# Patient Record
Sex: FEMALE | Race: WHITE | NOT HISPANIC OR LATINO | Employment: OTHER | URBAN - METROPOLITAN AREA
[De-identification: names, ages, dates, MRNs, and addresses within clinical notes are randomized per-mention and may not be internally consistent; named-entity substitution may affect disease eponyms.]

---

## 2017-01-05 ENCOUNTER — TELEPHONE (OUTPATIENT)
Dept: NEUROLOGY | Facility: MEDICAL CENTER | Age: 47
End: 2017-01-05

## 2017-01-05 NOTE — TELEPHONE ENCOUNTER
Pt is calling and asking if she can see a chiropractor with her Arnold-Chiari decompression. She also wants to know if the tonsils are starting to descend again and how far have they dropped. Please advise. Thank you. JC

## 2017-01-07 ENCOUNTER — OFFICE VISIT (OUTPATIENT)
Dept: URGENT CARE | Facility: PHYSICIAN GROUP | Age: 47
End: 2017-01-07
Payer: COMMERCIAL

## 2017-01-07 VITALS
RESPIRATION RATE: 20 BRPM | DIASTOLIC BLOOD PRESSURE: 70 MMHG | SYSTOLIC BLOOD PRESSURE: 118 MMHG | HEIGHT: 65 IN | BODY MASS INDEX: 43.65 KG/M2 | OXYGEN SATURATION: 96 % | HEART RATE: 95 BPM | TEMPERATURE: 97.3 F | WEIGHT: 262 LBS

## 2017-01-07 DIAGNOSIS — J01.40 ACUTE PANSINUSITIS, RECURRENCE NOT SPECIFIED: ICD-10-CM

## 2017-01-07 DIAGNOSIS — H10.33 ACUTE BACTERIAL CONJUNCTIVITIS OF BOTH EYES: ICD-10-CM

## 2017-01-07 DIAGNOSIS — R42 DIZZINESS: ICD-10-CM

## 2017-01-07 PROCEDURE — 99214 OFFICE O/P EST MOD 30 MIN: CPT | Performed by: NURSE PRACTITIONER

## 2017-01-07 RX ORDER — MECLIZINE HYDROCHLORIDE 25 MG/1
25 TABLET ORAL 3 TIMES DAILY PRN
Qty: 15 TAB | Refills: 0 | Status: SHIPPED | OUTPATIENT
Start: 2017-01-07 | End: 2017-01-12

## 2017-01-07 RX ORDER — AMOXICILLIN AND CLAVULANATE POTASSIUM 875; 125 MG/1; MG/1
1 TABLET, FILM COATED ORAL 2 TIMES DAILY
Qty: 10 TAB | Refills: 0 | Status: SHIPPED | OUTPATIENT
Start: 2017-01-07 | End: 2017-01-12

## 2017-01-07 RX ORDER — POLYMYXIN B SULFATE AND TRIMETHOPRIM 1; 10000 MG/ML; [USP'U]/ML
1 SOLUTION OPHTHALMIC EVERY 4 HOURS
Qty: 10 ML | Refills: 0 | Status: SHIPPED | OUTPATIENT
Start: 2017-01-07 | End: 2017-01-14

## 2017-01-07 ASSESSMENT — ENCOUNTER SYMPTOMS
COUGH: 0
SORE THROAT: 0
WHEEZING: 0
CHILLS: 0
SPUTUM PRODUCTION: 0
WEAKNESS: 0
HEADACHES: 0
FEVER: 0
PALPITATIONS: 0
SHORTNESS OF BREATH: 0
DIZZINESS: 1
PHOTOPHOBIA: 0
EYE PAIN: 0
DIAPHORESIS: 0
DOUBLE VISION: 0
EYE REDNESS: 1
HEMOPTYSIS: 0
EYE DISCHARGE: 1
BLURRED VISION: 0
ORTHOPNEA: 0
MYALGIAS: 0

## 2017-01-07 NOTE — MR AVS SNAPSHOT
"        Demetra Castanonfidelia   2017 2:30 PM   Office Visit   MRN: [id number]    Department:  [facility]   Dept Phone:  [phone]    Description:  Female : 1970   Provider:  RUSTY Rendon           Reason for Visit     Sinus Problem x \"months\" increased 17      Allergies as of 2017     No Known Allergies      You were diagnosed with     Acute pansinusitis, recurrence not specified   [0125991]       Acute bacterial conjunctivitis of both eyes   [9841188]       Dizziness   [325813]         Vital Signs     Blood Pressure Pulse Temperature Respirations Height Weight    118/70 mmHg 95 36.3 °C (97.3 °F) 20 1.651 m (5' 5\") 118.842 kg (262 lb)    Body Mass Index Oxygen Saturation Smoking Status             43.60 kg/m2 96% Former Smoker         Basic Information     Date Of Birth Sex Race Ethnicity Preferred Language    1970 Female White Non- English      Your appointments     Mar 03, 2017  1:00 PM   Follow Up Visit with Nathan Mike M.D.   [facility] (--)    [address], [address]  [facility] [address] [phone]           You will be receiving a confirmation call a few days before your appointment from our automated call confirmation system.              Problem List              ICD-10-CM Priority Class Noted - Resolved    Hypothyroidism E03.9   2013 - Present    Meniere's disease    2013 - Present    Vertigo R42   2013 - Present    GERD (gastroesophageal reflux disease) K21.9   2013 - Present    Sinus pressure J34.89   3/3/2014 - Present    Arnold-Chiari malformation, type I (HCC) G93.5   2016 - Present    Intractable migraine without aura and without status migrainosus G43.019   2016 - Present      Health Maintenance        Date Due Completion Dates    IMM DTaP/Tdap/Td Vaccine (1 - Tdap) 3/29/1991 ---    MAMMOGRAM 3/29/2012 ---    IMM INFLUENZA (1) 2016 10/22/2013    PAP SMEAR 2017 " (N/S)    Override on 1/28/2014: (N/S) (dr benoit)            Current Immunizations     Influenza TIV (IM) 10/22/2013      Below and/or attached are the medications your provider expects you to take. Review all of your home medications and newly ordered medications with your provider and/or pharmacist. Follow medication instructions as directed by your provider and/or pharmacist. Please keep your medication list with you and share with your provider. Update the information when medications are discontinued, doses are changed, or new medications (including over-the-counter products) are added; and carry medication information at all times in the event of emergency situations     Allergies:  No Known Allergies          Medications  Valid as of: January 07, 2017 -  3:03 PM    Generic Name Brand Name Tablet Size Instructions for use    Albuterol Sulfate (Aero Soln) albuterol 108 (90 BASE) MCG/ACT Inhale 2 Puffs by mouth every 6 hours as needed for Shortness of Breath.        Amoxicillin-Pot Clavulanate (Tab) AUGMENTIN 875-125 MG Take 1 Tab by mouth 2 times a day for 5 days.        Biotin (Tab) Biotin 5000 MCG Take  by mouth.        Cyclobenzaprine HCl (Tab) FLEXERIL 10 MG Take 1 Tab by mouth 3 times a day as needed.        DiazePAM (Tab) VALIUM 5 MG Take 1 Tab by mouth every 6 hours as needed (dizziness).        Levothyroxine Sodium (Tab) SYNTHROID 25 MCG Take 1 Tab by mouth every day.        Magnesium (Tab) Magnesium 250 MG Take  by mouth.        Meclizine HCl (Tab) ANTIVERT 25 MG Take 1 Tab by mouth 3 times a day as needed for Dizziness for up to 5 days.        Milk Thistle   Take  by mouth.        Omega-3 Fatty Acids   Take  by mouth.        Omeprazole (CAPSULE DELAYED RELEASE) PRILOSEC 10 MG Take 10 mg by mouth every day.        Polymyxin B-Trimethoprim (Solution) POLYTRIM 97555-5.1 UNIT/ML-% Place 1 Drop in both eyes every 4 hours for 7 days.        Probiotic Product   Take  by mouth.        Pseudoephedrine HCl    Take  by mouth.        .                 Medicines prescribed today were sent to:     [id number] - [place], [place] - [address]    [address] [place] [address]    Phone: [phone] Fax: [phone]    Open 24 Hours?: No      Medication refill instructions:       If your prescription bottle indicates you have medication refills left, it is not necessary to call your provider’s office. Please contact your pharmacy and they will refill your medication.    If your prescription bottle indicates you do not have any refills left, you may request refills at any time through one of the following ways: The online OceanTailer system (except Urgent Care), by calling your provider’s office, or by asking your pharmacy to contact your provider’s office with a refill request. Medication refills are processed only during regular business hours and may not be available until the next business day. Your provider may request additional information or to have a follow-up visit with you prior to refilling your medication.   *Please Note: Medication refills are assigned a new Rx number when refilled electronically. Your pharmacy may indicate that no refills were authorized even though a new prescription for the same medication is available at the pharmacy. Please request the medicine by name with the pharmacy before contacting your provider for a refill.           OceanTailer Access Code: [email or web address]  Expires: 1/25/2017  8:44 AM    OceanTailer  A secure, online tool to manage your health information     [employer]’s OceanTailer® is a secure, online tool that connects you to your personalized health information from the privacy of your home -- day or night - making it very easy for you to manage your healthcare. Once the activation process is completed, you can even access your medical information using the OceanTailer luz, which is available for free in the Apple Luz store or Google Play store.     OceanTailer provides the  following levels of access (as shown below):   My Chart Features   [facility] Doctor [facility]  Specialists [facility]  Urgent  Care Non-[facility]  Primary Care  Doctor   Email your healthcare team securely and privately 24/7 X X X    Manage appointments: schedule your next appointment; view details of past/upcoming appointments X      Request prescription refills. X      View recent personal medical records, including lab and immunizations X X X X   View health record, including health history, allergies, medications X X X X   Read reports about your outpatient visits, procedures, consult and ER notes X X X X   See your discharge summary, which is a recap of your hospital and/or ER visit that includes your diagnosis, lab results, and care plan. X X       How to register for M[employer]:  1. Go to  [email or web address].  2. Click on the Sign Up Now box, which takes you to the New Member Sign Up page. You will need to provide the following information:  a. Enter your Mebelrama Access Code exactly as it appears at the top of this page. (You will not need to use this code after you’ve completed the sign-up process. If you do not sign up before the expiration date, you must request a new code.)   b. Enter your date of birth.   c. Enter your home email address.   d. Click Submit, and follow the next screen’s instructions.  3. Create a Mebelrama ID. This will be your Mebelrama login ID and cannot be changed, so think of one that is secure and easy to remember.  4. Create a Mebelrama password. You can change your password at any time.  5. Enter your Password Reset Question and Answer. This can be used at a later time if you forget your password.   6. Enter your e-mail address. This allows you to receive e-mail notifications when new information is available in Mebelrama.  7. Click Sign Up. You can now view your health information.    For assistance activating your Mebelrama account, call [phone]

## 2017-01-07 NOTE — PROGRESS NOTES
"Subjective:      Demetra Kendrick is a 46 y.o. female who presents with Sinus Problem            Sinus Problem  Associated symptoms include congestion. Pertinent negatives include no chills, coughing, diaphoresis, ear pain, headaches, shortness of breath or sore throat.   Patient comes in today with a months long history of sinus congestion and dizziness.  She had a consult with ENT in October or November of 2016 and was advised to discontinue use of Afrin nasal spray, which she had been using daily for years.  She reports that since then, the congestion and dizziness have improved but persisted.  She reports a recent URI 10 days ago with progressively worsening sinus pain and drainage over the past 7 days.  She also has new onset of bilateral eye redness with a matting purulence since yesterday.  She is currently taking Claritin D and using a neti pot per her ENT's instructions from her consult several months ago.  Denies any fever, chills, otalgia or cough.    Review of Systems   Constitutional: Positive for malaise/fatigue. Negative for fever, chills and diaphoresis.   HENT: Positive for congestion. Negative for ear pain and sore throat.    Eyes: Positive for discharge and redness. Negative for blurred vision, double vision, photophobia and pain.   Respiratory: Negative for cough, hemoptysis, sputum production, shortness of breath and wheezing.    Cardiovascular: Negative for chest pain, palpitations, orthopnea and leg swelling.   Musculoskeletal: Negative for myalgias.   Skin: Negative for rash.   Neurological: Positive for dizziness. Negative for weakness and headaches.     Medications, Allergies, and current problem list reviewed today in Epic     Objective:     /70 mmHg  Pulse 95  Temp(Src) 36.3 °C (97.3 °F)  Resp 20  Ht 1.651 m (5' 5\")  Wt 118.842 kg (262 lb)  BMI 43.60 kg/m2  SpO2 96%     Physical Exam   Constitutional: She is oriented to person, place, and time. She appears " well-developed and well-nourished. No distress.   HENT:   Head: Normocephalic.   Mouth/Throat: Oropharynx is clear and moist. No oropharyngeal exudate.   Nares patent.  Diffuse sinus pain on palpation.  Bilateral clear middle ear effusion with no erythema or purulence.     Eyes: EOM are normal. Pupils are equal, round, and reactive to light. Right eye exhibits discharge. Left eye exhibits discharge. No scleral icterus.   Bilateral conjunctival injection with scant matting purulence on the lower lash line and inner canthus.  No periorbital pain, swelling, or erythema.  No nystagmus or visual field cuts.   Neck: Neck supple. No JVD present. No tracheal deviation present. No thyromegaly present.   Cardiovascular: Normal rate, regular rhythm and normal heart sounds.  Exam reveals no gallop and no friction rub.    No murmur heard.  Pulmonary/Chest: Effort normal and breath sounds normal. No stridor. No respiratory distress. She has no wheezes. She has no rales. She exhibits no tenderness.   Musculoskeletal: She exhibits no edema.   Lymphadenopathy:     She has no cervical adenopathy.   Neurological: She is alert and oriented to person, place, and time.   Skin: Skin is warm and dry. No rash noted. She is not diaphoretic. No erythema.   Vitals reviewed.              Assessment/Plan:     1. Acute pansinusitis, recurrence not specified  - amoxicillin-clavulanate (AUGMENTIN) 875-125 MG Tab; Take 1 Tab by mouth 2 times a day for 5 days.  Dispense: 10 Tab; Refill: 0    2. Acute bacterial conjunctivitis of both eyes  - polymixin-trimethoprim (POLYTRIM) 92585-5.1 UNIT/ML-% Solution; Place 1 Drop in both eyes every 4 hours for 7 days.  Dispense: 10 mL; Refill: 0    3. Dizziness  - meclizine (ANTIVERT) 25 MG Tab; Take 1 Tab by mouth 3 times a day as needed for Dizziness for up to 5 days.  Dispense: 15 Tab; Refill: 0    Take full course of antibiotics: systemic for sinusitis and ophthalmic for presumed bacterial conjunctivitis.  OTC  Claritin D prn symptom management.  Antivert as prescribed prn dizziness.  Maintain adequate po hydration.  RTC in 5-7 days if symptoms persist, sooner if worse.  Follow up with ENT for any persistent or worsening dizziness.  Do not drive while dizzy.  Patient verbalized understanding of and agreed with plan of care.

## 2017-01-28 ENCOUNTER — HOSPITAL ENCOUNTER (EMERGENCY)
Facility: MEDICAL CENTER | Age: 47
End: 2017-01-28
Attending: EMERGENCY MEDICINE
Payer: COMMERCIAL

## 2017-01-28 VITALS
OXYGEN SATURATION: 98 % | DIASTOLIC BLOOD PRESSURE: 69 MMHG | RESPIRATION RATE: 18 BRPM | TEMPERATURE: 97.2 F | WEIGHT: 255.73 LBS | SYSTOLIC BLOOD PRESSURE: 145 MMHG | HEIGHT: 65 IN | HEART RATE: 81 BPM | BODY MASS INDEX: 42.61 KG/M2

## 2017-01-28 DIAGNOSIS — R42 VERTIGO: ICD-10-CM

## 2017-01-28 DIAGNOSIS — H65.93 BILATERAL SEROUS OTITIS MEDIA, UNSPECIFIED CHRONICITY: ICD-10-CM

## 2017-01-28 PROCEDURE — 99283 EMERGENCY DEPT VISIT LOW MDM: CPT

## 2017-01-28 RX ORDER — AMOXICILLIN 875 MG/1
875 TABLET, COATED ORAL 2 TIMES DAILY
Qty: 40 TAB | Refills: 0 | Status: SHIPPED | OUTPATIENT
Start: 2017-01-28 | End: 2017-02-17

## 2017-01-28 ASSESSMENT — ENCOUNTER SYMPTOMS
CHILLS: 0
EYE PAIN: 1
FEVER: 0
NAUSEA: 1
DIZZINESS: 1

## 2017-01-28 ASSESSMENT — PAIN SCALES - GENERAL: PAINLEVEL_OUTOF10: 0

## 2017-01-28 NOTE — ED AVS SNAPSHOT
1/28/2017          Demetra Kendrick  [address] [place] [address]    Dear Demetra:    [facility] wants to ensure your discharge home is safe and you or your loved ones have had all your questions answered regarding your care after you leave the hospital.    You may receive a telephone call within two days of your discharge.  This call is to make certain you understand your discharge instructions as well as ensure we provided you with the best care possible during your stay with us.     The call will only last approximately 3-5 minutes and will be done by a nurse.    Once again, we want to ensure your discharge home is safe and that you have a clear understanding of any next steps in your care.  If you have any questions or concerns, please do not hesitate to contact us, we are here for you.  Thank you for choosing [facility] for your healthcare needs.    Sincerely,    John Arce    [facility]

## 2017-01-28 NOTE — ED PROVIDER NOTES
ED Provider Note    Scribed for Ernesto Saul M.D. by Micheline Irizarry. 1/28/2017, 9:36 AM.    Primary care provider: Jaimie Whittington M.D.  Means of arrival: walk in   History obtained from: patient   History limited by: none       CHIEF COMPLAINT  Chief Complaint   Patient presents with   • Nausea   • Dizziness   • Headache       HPI  Demetra Kendrick is a 46 y.o. female with a history of chiari malformation who presents to the Emergency Department for dizziness onset three weeks ago with progressively worsening severity. The patient describes her symptoms as similar to vertigo with associated motion sickness like she is on a boat. With walking she feels like she is walking side ways or walking on ice. Patient had nausea yesterday. She also complains of intermittent itchiness to her ears, bilateral mild eye pain and inability to smell. Patient was recently evaluated by her ENT specialist after using Afrin every day for several months. She has since stopped using Afrin. Patient was also recently seen by Dr. Mike, neurology, who reported chronic changes on her MRI. She denies fevers and chills.       REVIEW OF SYSTEMS  Review of Systems   Constitutional: Negative for fever and chills.   HENT:        Pruritis to bilateral ears. Unable to smell    Eyes: Positive for pain.   Gastrointestinal: Positive for nausea.   Neurological: Positive for dizziness.          PAST MEDICAL HISTORY   has a past medical history of Thyroid disease and GERD (gastroesophageal reflux disease).   Chiari malformation       SURGICAL HISTORY   has past surgical history that includes other neurological surg (2010).      SOCIAL HISTORY  Social History   Substance Use Topics   • Smoking status: Former Smoker -- 0.50 packs/day for 18 years     Types: Cigarettes     Quit date: 08/16/2012   • Smokeless tobacco: Never Used      Comment: quit 8/18/12   • Alcohol Use: 0.0 oz/week     0 Standard drinks or equivalent per week      Comment: social  "     History   Drug Use No       FAMILY HISTORY  Family History   Problem Relation Age of Onset   • Thyroid Mother    • Lung Disease Father    • GI Father      ulcerative colitis   • Arthritis Father      gout   • Thyroid Sister    • Other Sister      MS   • Thyroid Sister        CURRENT MEDICATIONS  Home Medications     **Home medications have not yet been reviewed for this encounter**          ALLERGIES  No Known Allergies        PHYSICAL EXAM  VITAL SIGNS: /74 mmHg  Pulse 89  Temp(Src) 36.2 °C (97.2 °F) (Temporal)  Resp 16  Ht 1.651 m (5' 5\")  Wt 116 kg (255 lb 11.7 oz)  BMI 42.56 kg/m2  SpO2 98%  Constitutional:   No acute distress  HENT:  Moist mucous membranes. TMs with yellow discoloration to the lower half of the bilateral TMs. Bilateral nasal congestion.   Eyes:  No conjunctivitis or icterus  Neck:  trachea is midline, no palpable thyroid  Lymphatic:  No cervical lymphadenopathy  Cardiovascular: Regular rate and rhythm, no murmurs  Thorax & Lungs:  Normal breath sounds, no rhonchi  Abdomen: Obese.  Soft, Non-tender  Skin:.  no rash  Back:  Non-tender, no CVA tenderness  Extremities:   no edema  Vascular:  symmetric radial pulse  Neurologic: Alert and oriented. Cranial nerves II-XII intact, EOMs intact, no tongue deviation, PERRL, no facial asymmetry to motor or sensation, symmetric palate, normal finger-to-nose test, no pronator drift. No focal motor deficits. Symmetric reflexes. Normal station and gait, normal tandem walk.        COURSE & MEDICAL DECISION MAKING  Pertinent Labs & Imaging studies reviewed. (See chart for details)    9:36 AM - Patient seen and examined at bedside.     9:49 AM Obtained and reviewed past medical records which indicated the patient had an MRI in October which indicated congestion of the right sinus.     9:55 AM Patient agrees to discharge home with a course of antibiotics and follow up to Dr. Fish, ENT.   She was encouraged to continue taking Flonase.  The patient " will return for new or worsening symptoms and is stable at the time of discharge.            DISPOSITION:  Patient will be discharged home in stable condition.      FOLLOW UP:  Jaimie Whittington M.D.  [address]  [place] [address] [phone]              OUTPATIENT MEDICATIONS:  New Prescriptions    AMOXICILLIN (AMOXIL) 875 MG TABLET    Take 1 Tab by mouth 2 times a day for 20 days.       FINAL IMPRESSION  1. Bilateral serous otitis media, unspecified chronicity    2. Vertigo             Micheline LEAL (Suri), am scribing for, and in the presence of, Ernesto Saul M.D..  Electronically signed by: Micheline Briscoe), 1/28/2017  Ernesto LEAL M.D. personally performed the services described in this documentation, as scribed by Micheline Irizarry in my presence, and it is both accurate and complete.      The note accurately reflects work and decisions made by me.  Ernesto Saul  1/28/2017  2:09 PM

## 2017-01-28 NOTE — ED NOTES
"Pt with multiple complaints, c/o \" not being able to smell, things not tasting right, worsening of chronic dizziness\"   "

## 2017-01-28 NOTE — ED NOTES
Pt amb to triage.  Chief Complaint   Patient presents with   • Nausea   • Dizziness   • Headache     Pt reports symptoms xmonths. No neuro deficts noted.  Seen here for the same in October 2016.  Has appt w/ [facility] in March.  Pt asked to wait in lobby. Advised of wait time and triage process. Advised to alert RN w/ any changes in condition. Thanked for patience.

## 2017-01-28 NOTE — DISCHARGE INSTRUCTIONS
Sinusitis, Adult  Sinusitis is redness, soreness, and inflammation of the paranasal sinuses. Paranasal sinuses are air pockets within the bones of your face. They are located beneath your eyes, in the middle of your forehead, and above your eyes. In healthy paranasal sinuses, mucus is able to drain out, and air is able to circulate through them by way of your nose. However, when your paranasal sinuses are inflamed, mucus and air can become trapped. This can allow bacteria and other germs to grow and cause infection.  Sinusitis can develop quickly and last only a short time (acute) or continue over a long period (chronic). Sinusitis that lasts for more than 12 weeks is considered chronic.  CAUSES  Causes of sinusitis include:  · Allergies.  · Structural abnormalities, such as displacement of the cartilage that separates your nostrils (deviated septum), which can decrease the air flow through your nose and sinuses and affect sinus drainage.  · Functional abnormalities, such as when the small hairs (cilia) that line your sinuses and help remove mucus do not work properly or are not present.  SIGNS AND SYMPTOMS  Symptoms of acute and chronic sinusitis are the same. The primary symptoms are pain and pressure around the affected sinuses. Other symptoms include:  · Upper toothache.  · Earache.  · Headache.  · Bad breath.  · Decreased sense of smell and taste.  · A cough, which worsens when you are lying flat.  · Fatigue.  · Fever.  · Thick drainage from your nose, which often is green and may contain pus (purulent).  · Swelling and warmth over the affected sinuses.  DIAGNOSIS  Your health care provider will perform a physical exam. During your exam, your health care provider may perform any of the following to help determine if you have acute sinusitis or chronic sinusitis:  · Look in your nose for signs of abnormal growths in your nostrils (nasal polyps).  · Tap over the affected sinus to check for signs of  infection.  · View the inside of your sinuses using an imaging device that has a light attached (endoscope).  If your health care provider suspects that you have chronic sinusitis, one or more of the following tests may be recommended:  · Allergy tests.  · Nasal culture. A sample of mucus is taken from your nose, sent to a lab, and screened for bacteria.  · Nasal cytology. A sample of mucus is taken from your nose and examined by your health care provider to determine if your sinusitis is related to an allergy.  TREATMENT  Most cases of acute sinusitis are related to a viral infection and will resolve on their own within 10 days. Sometimes, medicines are prescribed to help relieve symptoms of both acute and chronic sinusitis. These may include pain medicines, decongestants, nasal steroid sprays, or saline sprays.  However, for sinusitis related to a bacterial infection, your health care provider will prescribe antibiotic medicines. These are medicines that will help kill the bacteria causing the infection.  Rarely, sinusitis is caused by a fungal infection. In these cases, your health care provider will prescribe antifungal medicine.  For some cases of chronic sinusitis, surgery is needed. Generally, these are cases in which sinusitis recurs more than 3 times per year, despite other treatments.  HOME CARE INSTRUCTIONS  · Drink plenty of water. Water helps thin the mucus so your sinuses can drain more easily.  · Use a humidifier.  · Inhale steam 3-4 times a day (for example, sit in the bathroom with the shower running).  · Apply a warm, moist washcloth to your face 3-4 times a day, or as directed by your health care provider.  · Use saline nasal sprays to help moisten and clean your sinuses.  · Take medicines only as directed by your health care provider.  · If you were prescribed either an antibiotic or antifungal medicine, finish it all even if you start to feel better.  SEEK IMMEDIATE MEDICAL CARE IF:  · You have  increasing pain or severe headaches.  · You have nausea, vomiting, or drowsiness.  · You have swelling around your face.  · You have vision problems.  · You have a stiff neck.  · You have difficulty breathing.     This information is not intended to replace advice given to you by your health care provider. Make sure you discuss any questions you have with your health care provider.     Document Released: 12/18/2006 Document Revised: 01/08/2016 Document Reviewed: 01/01/2013  [employer] Interactive Patient Education ©2016 [employer]    Vertigo  Vertigo means you feel like you or your surroundings are moving when they are not. Vertigo can be dangerous if it occurs when you are at work, driving, or performing difficult activities.   CAUSES   Vertigo occurs when there is a conflict of signals sent to your brain from the visual and sensory systems in your body. There are many different causes of vertigo, including:  · Infections, especially in the inner ear.  · A bad reaction to a drug or misuse of alcohol and medicines.  · Withdrawal from drugs or alcohol.  · Rapidly changing positions, such as lying down or rolling over in bed.  · A migraine headache.  · Decreased blood flow to the brain.  · Increased pressure in the brain from a head injury, infection, tumor, or bleeding.  SYMPTOMS   You may feel as though the world is spinning around or you are falling to the ground. Because your balance is upset, vertigo can cause nausea and vomiting. You may have involuntary eye movements (nystagmus).  DIAGNOSIS   Vertigo is usually diagnosed by physical exam. If the cause of your vertigo is unknown, your caregiver may perform imaging tests, such as an MRI scan (magnetic resonance imaging).  TREATMENT   Most cases of vertigo resolve on their own, without treatment. Depending on the cause, your caregiver may prescribe certain medicines. If your vertigo is related to body position issues, your caregiver may recommend movements or  procedures to correct the problem. In rare cases, if your vertigo is caused by certain inner ear problems, you may need surgery.  HOME CARE INSTRUCTIONS   · Follow your caregiver's instructions.  · Avoid driving.  · Avoid operating heavy machinery.  · Avoid performing any tasks that would be dangerous to you or others during a vertigo episode.  · Tell your caregiver if you notice that certain medicines seem to be causing your vertigo. Some of the medicines used to treat vertigo episodes can actually make them worse in some people.  SEEK IMMEDIATE MEDICAL CARE IF:   · Your medicines do not relieve your vertigo or are making it worse.  · You develop problems with talking, walking, weakness, or using your arms, hands, or legs.  · You develop severe headaches.  · Your nausea or vomiting continues or gets worse.  · You develop visual changes.  · A family member notices behavioral changes.  · Your condition gets worse.  MAKE SURE YOU:  · Understand these instructions.  · Will watch your condition.  · Will get help right away if you are not doing well or get worse.     This information is not intended to replace advice given to you by your health care provider. Make sure you discuss any questions you have with your health care provider.     Document Released: 09/27/2006 Document Revised: 03/11/2013 Document Reviewed: 04/11/2016  [employer] Interactive Patient Education ©2016 [employer]

## 2017-01-28 NOTE — ED NOTES
Pt discharged with instructions and script. Pt verbalizes the understanding of instructions. Pt ambulated out of ER without any difficulty.

## 2017-01-28 NOTE — ED AVS SNAPSHOT
After Visit Summary                                                                                                                Demetra Kendrick   MRN: [id number]    Department:  [facility], Emergency Dept   Date of Visit:  1/28/2017            [facility], Emergency Dept    [address]    [place] [address]    Phone:  [phone]      You were seen by     Ernesto Saul M.D.      Your Diagnosis Was     Bilateral serous otitis media, unspecified chronicity     H65.93       Follow-up Information     1. Follow up with Jaimie Whittington M.D..    Specialty:  Family Medicine    Contact information    [address]  [place] [address] [phone]        Medication Information     Review all of your home medications and newly ordered medications with your primary doctor and/or pharmacist as soon as possible. Follow medication instructions as directed by your doctor and/or pharmacist.     Please keep your complete medication list with you and share with your physician. Update the information when medications are discontinued, doses are changed, or new medications (including over-the-counter products) are added; and carry medication information at all times in the event of emergency situations.               Medication List      START taking these medications        Instructions    amoxicillin 875 MG tablet   Commonly known as:  AMOXIL    Take 1 Tab by mouth 2 times a day for 20 days.   Dose:  875 mg         ASK your doctor about these medications        Instructions    albuterol 108 (90 BASE) MCG/ACT Aers inhalation aerosol    Inhale 2 Puffs by mouth every 6 hours as needed for Shortness of Breath.   Dose:  2 Puff       Biotin 5000 MCG Tabs    Take  by mouth.       cyclobenzaprine 10 MG Tabs   Commonly known as:  FLEXERIL    Take 1 Tab by mouth 3 times a day as needed.   Dose:  10 mg       diazepam 5 MG Tabs   Commonly known as:  VALIUM    Take 1 Tab by mouth  every 6 hours as needed (dizziness).   Dose:  5 mg       levothyroxine 25 MCG Tabs   Commonly known as:  SYNTHROID    Take 1 Tab by mouth every day.   Dose:  25 mcg       Magnesium 250 MG Tabs    Take  by mouth.       MILK THISTLE PO    Take  by mouth.       OMEGA 3 PO    Take  by mouth.       PRILOSEC 10 MG Cpdr   Generic drug:  omeprazole    Take 10 mg by mouth every day.   Dose:  10 mg       PROBIOTIC DAILY PO    Take  by mouth.       SUDAFED 12 HOUR PO    Take  by mouth.                 Discharge Instructions       Sinusitis, Adult  Sinusitis is redness, soreness, and inflammation of the paranasal sinuses. Paranasal sinuses are air pockets within the bones of your face. They are located beneath your eyes, in the middle of your forehead, and above your eyes. In healthy paranasal sinuses, mucus is able to drain out, and air is able to circulate through them by way of your nose. However, when your paranasal sinuses are inflamed, mucus and air can become trapped. This can allow bacteria and other germs to grow and cause infection.  Sinusitis can develop quickly and last only a short time (acute) or continue over a long period (chronic). Sinusitis that lasts for more than 12 weeks is considered chronic.  CAUSES  Causes of sinusitis include:  · Allergies.  · Structural abnormalities, such as displacement of the cartilage that separates your nostrils (deviated septum), which can decrease the air flow through your nose and sinuses and affect sinus drainage.  · Functional abnormalities, such as when the small hairs (cilia) that line your sinuses and help remove mucus do not work properly or are not present.  SIGNS AND SYMPTOMS  Symptoms of acute and chronic sinusitis are the same. The primary symptoms are pain and pressure around the affected sinuses. Other symptoms include:  · Upper toothache.  · Earache.  · Headache.  · Bad breath.  · Decreased sense of smell and taste.  · A cough, which worsens when you are lying  flat.  · Fatigue.  · Fever.  · Thick drainage from your nose, which often is green and may contain pus (purulent).  · Swelling and warmth over the affected sinuses.  DIAGNOSIS  Your health care provider will perform a physical exam. During your exam, your health care provider may perform any of the following to help determine if you have acute sinusitis or chronic sinusitis:  · Look in your nose for signs of abnormal growths in your nostrils (nasal polyps).  · Tap over the affected sinus to check for signs of infection.  · View the inside of your sinuses using an imaging device that has a light attached (endoscope).  If your health care provider suspects that you have chronic sinusitis, one or more of the following tests may be recommended:  · Allergy tests.  · Nasal culture. A sample of mucus is taken from your nose, sent to a lab, and screened for bacteria.  · Nasal cytology. A sample of mucus is taken from your nose and examined by your health care provider to determine if your sinusitis is related to an allergy.  TREATMENT  Most cases of acute sinusitis are related to a viral infection and will resolve on their own within 10 days. Sometimes, medicines are prescribed to help relieve symptoms of both acute and chronic sinusitis. These may include pain medicines, decongestants, nasal steroid sprays, or saline sprays.  However, for sinusitis related to a bacterial infection, your health care provider will prescribe antibiotic medicines. These are medicines that will help kill the bacteria causing the infection.  Rarely, sinusitis is caused by a fungal infection. In these cases, your health care provider will prescribe antifungal medicine.  For some cases of chronic sinusitis, surgery is needed. Generally, these are cases in which sinusitis recurs more than 3 times per year, despite other treatments.  HOME CARE INSTRUCTIONS  · Drink plenty of water. Water helps thin the mucus so your sinuses can drain more  easily.  · Use a humidifier.  · Inhale steam 3-4 times a day (for example, sit in the bathroom with the shower running).  · Apply a warm, moist washcloth to your face 3-4 times a day, or as directed by your health care provider.  · Use saline nasal sprays to help moisten and clean your sinuses.  · Take medicines only as directed by your health care provider.  · If you were prescribed either an antibiotic or antifungal medicine, finish it all even if you start to feel better.  SEEK IMMEDIATE MEDICAL CARE IF:  · You have increasing pain or severe headaches.  · You have nausea, vomiting, or drowsiness.  · You have swelling around your face.  · You have vision problems.  · You have a stiff neck.  · You have difficulty breathing.     This information is not intended to replace advice given to you by your health care provider. Make sure you discuss any questions you have with your health care provider.     Document Released: 12/18/2006 Document Revised: 01/08/2016 Document Reviewed: 01/01/2013  [employer] Interactive Patient Education ©2016 [employer]    Vertigo  Vertigo means you feel like you or your surroundings are moving when they are not. Vertigo can be dangerous if it occurs when you are at work, driving, or performing difficult activities.   CAUSES   Vertigo occurs when there is a conflict of signals sent to your brain from the visual and sensory systems in your body. There are many different causes of vertigo, including:  · Infections, especially in the inner ear.  · A bad reaction to a drug or misuse of alcohol and medicines.  · Withdrawal from drugs or alcohol.  · Rapidly changing positions, such as lying down or rolling over in bed.  · A migraine headache.  · Decreased blood flow to the brain.  · Increased pressure in the brain from a head injury, infection, tumor, or bleeding.  SYMPTOMS   You may feel as though the world is spinning around or you are falling to the ground. Because your balance is upset,  vertigo can cause nausea and vomiting. You may have involuntary eye movements (nystagmus).  DIAGNOSIS   Vertigo is usually diagnosed by physical exam. If the cause of your vertigo is unknown, your caregiver may perform imaging tests, such as an MRI scan (magnetic resonance imaging).  TREATMENT   Most cases of vertigo resolve on their own, without treatment. Depending on the cause, your caregiver may prescribe certain medicines. If your vertigo is related to body position issues, your caregiver may recommend movements or procedures to correct the problem. In rare cases, if your vertigo is caused by certain inner ear problems, you may need surgery.  HOME CARE INSTRUCTIONS   · Follow your caregiver's instructions.  · Avoid driving.  · Avoid operating heavy machinery.  · Avoid performing any tasks that would be dangerous to you or others during a vertigo episode.  · Tell your caregiver if you notice that certain medicines seem to be causing your vertigo. Some of the medicines used to treat vertigo episodes can actually make them worse in some people.  SEEK IMMEDIATE MEDICAL CARE IF:   · Your medicines do not relieve your vertigo or are making it worse.  · You develop problems with talking, walking, weakness, or using your arms, hands, or legs.  · You develop severe headaches.  · Your nausea or vomiting continues or gets worse.  · You develop visual changes.  · A family member notices behavioral changes.  · Your condition gets worse.  MAKE SURE YOU:  · Understand these instructions.  · Will watch your condition.  · Will get help right away if you are not doing well or get worse.     This information is not intended to replace advice given to you by your health care provider. Make sure you discuss any questions you have with your health care provider.     Document Released: 09/27/2006 Document Revised: 03/11/2013 Document Reviewed: 04/11/2016  [employer] Interactive Patient Education ©2016 [employer]              Patient  Information     Patient Information    Following emergency treatment: all patient requiring follow-up care must return either to a private physician or a clinic if your condition worsens before you are able to obtain further medical attention, please return to the emergency room.     Billing Information    At [facility], we work to make the billing process streamlined for our patients.  Our Representatives are here to answer any questions you may have regarding your hospital bill.  If you have insurance coverage and have supplied your insurance information to us, we will submit a claim to your insurer on your behalf.  Should you have any questions regarding your bill, we can be reached online or by phone as follows:  Online: You are able pay your bills online or live chat with our representatives about any billing questions you may have. We are here to help Monday - Friday from 8:00am to 7:30pm and 9:00am - 12:00pm on Saturdays.  Please visit [email or web address]  for more information.   Phone:  [phone] or [phone]    Please note that your emergency physician, surgeon, pathologist, radiologist, anesthesiologist, and other specialists are not employed by [facility] and will therefore bill separately for their services.  Please contact them directly for any questions concerning their bills at the numbers below:     Emergency Physician Services:  [phone]  [facility]:  [phone]  [facility]:  [phone]  [facility]:  [phone]    1. Your final bill may vary from the amount quoted upon discharge if all procedures are not complete at that time, or if your doctor has additional procedures of which we are not aware. You will receive an additional bill if you return to the Emergency Department at [facility] for suture removal regardless of the facility of which the sutures were placed.     2. Please arrange for  settlement of this account at the emergency registration.    3. All self-pay accounts are due in full at the time of treatment.  If you are unable to meet this obligation then payment is expected within 4-5 days.     4. If you have had radiology studies (CT, X-ray, Ultrasound, MRI), you have received a preliminary result during your emergency department visit. Please contact the radiology department [phone] to receive a copy of your final result. Please discuss the Final result with your primary physician or with the follow up physician provided.     Crisis Hotline:  [place] Crisis Hotline:  4-383-TQRYVRF or 1-533.843.1834  [place] Crisis Hotline:    1-377.724.6205 or 135-059-9431         ED Discharge Follow Up Questions    1. In order to provide you with very good care, we would like to follow up with a phone call in the next few days.  May we have your permission to contact you?     YES /  NO    2. What is the best phone number to call you? (       )_____-__________    3. What is the best time to call you?      Morning  /  Afternoon  /  Evening                   Patient Signature:  ____________________________________________________________    Date:  ____________________________________________________________      Your appointments     Mar 03, 2017  1:00 PM   Follow Up Visit with Nathan Mike M.D.   [facility] (--)     [address]  [facility] [address] [phone]           You will be receiving a confirmation call a few days before your appointment from our automated call confirmation system.

## 2017-01-28 NOTE — ED AVS SNAPSHOT
Olocode Access Code: [email or web address]  Expires: 2/23/2017 11:24 AM    Olocode  A secure, online tool to manage your health information     [employer]’s Olocode® is a secure, online tool that connects you to your personalized health information from the privacy of your home -- day or night - making it very easy for you to manage your healthcare. Once the activation process is completed, you can even access your medical information using the Olocode luz, which is available for free in the Apple Luz store or Google Play store.     Olocode provides the following levels of access (as shown below):   My Chart Features   [facility] Primary Care Doctor [facility]  Specialists [facility]  Urgent  Care Non-[facility]  Primary Care  Doctor   Email your healthcare team securely and privately 24/7 X X X X   Manage appointments: schedule your next appointment; view details of past/upcoming appointments X      Request prescription refills. X      View recent personal medical records, including lab and immunizations X X X X   View health record, including health history, allergies, medications X X X X   Read reports about your outpatient visits, procedures, consult and ER notes X X X X   See your discharge summary, which is a recap of your hospital and/or ER visit that includes your diagnosis, lab results, and care plan. X X       How to register for Olocode:  1. Go to  [email or web address].  2. Click on the Sign Up Now box, which takes you to the New Member Sign Up page. You will need to provide the following information:  a. Enter your Olocode Access Code exactly as it appears at the top of this page. (You will not need to use this code after you’ve completed the sign-up process. If you do not sign up before the expiration date, you must request a new code.)   b. Enter your date of birth.   c. Enter your home email address.   d. Click Submit, and follow the next screen’s instructions.  3. Create a Olocode ID. This will be your Olocode  login ID and cannot be changed, so think of one that is secure and easy to remember.  4. Create a [employer] password. You can change your password at any time.  5. Enter your Password Reset Question and Answer. This can be used at a later time if you forget your password.   6. Enter your e-mail address. This allows you to receive e-mail notifications when new information is available in [employer].  7. Click Sign Up. You can now view your health information.    For assistance activating your [employer] account, call [phone]

## 2017-03-01 ENCOUNTER — OFFICE VISIT (OUTPATIENT)
Dept: BEHAVIORAL HEALTH | Facility: PHYSICIAN GROUP | Age: 47
End: 2017-03-01
Payer: COMMERCIAL

## 2017-03-01 VITALS
HEIGHT: 65 IN | DIASTOLIC BLOOD PRESSURE: 78 MMHG | BODY MASS INDEX: 43.32 KG/M2 | HEART RATE: 69 BPM | WEIGHT: 260 LBS | SYSTOLIC BLOOD PRESSURE: 124 MMHG

## 2017-03-01 DIAGNOSIS — R42 VERTIGO: ICD-10-CM

## 2017-03-01 DIAGNOSIS — F41.9 ANXIETY DISORDER, UNSPECIFIED TYPE: ICD-10-CM

## 2017-03-01 PROCEDURE — 99204 OFFICE O/P NEW MOD 45 MIN: CPT | Performed by: PSYCHIATRY & NEUROLOGY

## 2017-03-01 RX ORDER — LEVOTHYROXINE SODIUM 0.12 MG/1
62.5 TABLET ORAL
COMMUNITY

## 2017-03-01 NOTE — PROGRESS NOTES
INITIAL PSYCHIATRY EVALUATION      Chief Complaint   Patient presents with   • Establish Care   • Anxiety   • Dizziness         History Of Present Illness:  Demetra Kendrick is a 46 y.o. old female with history of Arnold Chiari information, chronic headaches, hypothyroidism, GERD comes in today for evaluation of anxiety. She reports having anxiety for most of her life. She feels that she likes to overanalyze things and is a lot more aware of her surroundings. She feels a little bit uncomfortable in social situations and in big crowds. But she feels that she is always been able to handle her anxiety without medications and has never caused any impairments in her personal and professional life. She feels that she is sensitive to a lot of things and usually has side effects from a lot of medications. Denies history of panic attacks. She has been dealing with dizziness leading to balance problems since October 2016. She has been seen by Neurology, ENT and optometry but no cause has been found for her dizziness. She has been working with her primary care physician for the same as well and was recently asked to stop taking Prilosec which was felt to be contributing to her dizziness. She stopped taking Prilosec about 3 weeks ago and has noticed an improvement in her symptoms. She still has dizziness but is more functional in her life now. Denies any recent falls. She was started on Zoloft and Xanax by her primary care physician in November 2016 for possible anxiety symptoms but she quit taking both of those medications in less than a week due to side effects on her mood. She has never tried any other medications for anxiety and is not open to trying medications at this time. Denies any current or past mood symptoms. Denies having thoughts of wanting to hurt herself or others.    Current psychiatric medications - None     Past Psychiatric History:  Denies history of suicide attempt or prior inpatient psychiatry  hospitalization.  Previous medication trials - Zoloft 25 mg for less than week, Xanax 0.25 mg    Past Medical/Surgical History:  Past Medical History   Diagnosis Date   • Thyroid disease    • GERD (gastroesophageal reflux disease)    • Arnold-Chiari malformation (CMS-HCC)    • Hypothyroidism    • Chronic headaches      Past Surgical History   Procedure Laterality Date   • Other neurological surg  2010     chiari malformation       Family Psychiatric History:  8 yo son - Asperger's Syndrome     Substance Use/Addiction History:  Alcohol - Social drinking  Nicotine - Denies  Illicit drugs - Denies    Social History:   and has a 9-year-old son. Lives with her family in [place].    Allergies:  Review of patient's allergies indicates no known allergies.    Medications:  Current Outpatient Prescriptions   Medication Sig Dispense Refill   • Flaxseed, Linseed, (FLAX SEEDS PO) Take  by mouth.     • BL POTASSIUM PO Take  by mouth.     • FOLINIC ACID-VIT B6-VIT B12 PO Take  by mouth.     • Chlorophyll (CHLOROXYGEN PO) Take  by mouth.     • GLUTAMINE PO Take  by mouth.     • levothyroxine (SYNTHROID) 125 MCG Tab Take 62.5 mcg by mouth Every morning on an empty stomach.     • Probiotic Product (PROBIOTIC DAILY PO) Take  by mouth.     • Magnesium 250 MG Tab Take  by mouth.     • Pseudoephedrine HCl (SUDAFED 12 HOUR PO) Take  by mouth.     • cyclobenzaprine (FLEXERIL) 10 MG Tab Take 1 Tab by mouth 3 times a day as needed. 20 Tab 0   • Omega-3 Fatty Acids (OMEGA 3 PO) Take  by mouth.     • Biotin 5000 MCG Tab Take  by mouth.     • MILK THISTLE PO Take  by mouth.     • albuterol (VENTOLIN OR PROVENTIL) 108 (90 BASE) MCG/ACT AERS inhalation aerosol Inhale 2 Puffs by mouth every 6 hours as needed for Shortness of Breath. 8.5 g 0   • omeprazole (PRILOSEC) 10 MG CPDR Take 10 mg by mouth every day.     • diazepam (VALIUM) 5 MG TABS Take 1 Tab by mouth every 6 hours as needed (dizziness). 15 Each 0     No current  "facility-administered medications for this visit.       Review of Symptoms:  Constitutional - Positive for fatigue  Eyes - Negative for blurry vision  HEENT - Negative for sore throat  Respiratory - Negative for shortness of breath, cough  CVS - Negative for chest pain, palpitations  GI - Negative for nausea, vomiting, abdominal pain, diarrhea, constipation. Positive for heart burn.   Skin - Negative for rash  Musculoskeletal - Negative for back pain  Neurological - Negative for headaches. Positive for dizziness.  Psychiatric - Positive for anxiety    Physical Examination:  Vital signs: /78 mmHg  Pulse 69  Ht 1.651 m (5' 5\")  Wt 117.935 kg (260 lb)  BMI 43.27 kg/m2  LMP 02/23/2017    Musculoskeletal: Normal gait. No abnormal movements.     Mental Status Evaluation:   General: Middle aged obese white female, dressed in casual attire, good grooming and hygiene, in no apparent distress, calm and cooperative, good eye contact, no psychomotor agitation or retardation  Orientation: Alert and oriented to person, place and time  Recent and remote memory: Intact  Attention span and concentration: Intact  Speech: Spontaneous, hyper talkative and rapid but not pressured, normal tone   Thought Process: Circumstantial, logical and goal directed  Thought Content: Denies suicidal or homicidal ideations, intent or plan  Perception: Denies auditory or visual hallucinations. No delusions noted  Associations: Intact  Language: Appropriate  Fund of knowledge and vocabulary: Adequate  Mood: \"good\"  Affect: Euthymic, mood congruent  Insight: Good  Judgment: Good    Impression:  1. Unspecified anxiety disorder    Medical Records/Labs/Diagnostic Tests Reviewed:  [place]  records - one prescription of Xanax in 2016    Plan:  Discussed her diagnosis and further management. She does appear to have some baseline anxiety but it does not seem to be contributing to her dizziness or causing any functional impairment in her life at this " time. Since there is no functional impairment will not prescribe medications for her anxiety. Educated her about medications and psychotherapy available for anxiety and asked her to schedule another appointment if her symptoms worsen or she changes her mind.    Return to clinic on as needed basis if symptoms worsen    The proposed treatment plan was discussed with the patient who was provided the opportunity to ask questions and make suggestions regarding alternative treatment. Patient verbalized understanding and expressed agreement with the plan.     Total face-to-face time: 45 minutes  More than 50% of face-to-face time was spent in counseling and coordinating care. Discussed anxiety disorders and appropriate management including medications and psychotherapy.    Aishwarya Lorenzo M.D.  03/01/2017    This note was created using voice recognition software (Dragon). The accuracy of the dictation is limited by the abilities of the software. I have reviewed the note prior to signing, however some errors in grammar and context are still possible. If you have any questions related to this note please do not hesitate to contact our office.

## 2017-03-03 ENCOUNTER — APPOINTMENT (OUTPATIENT)
Dept: NEUROLOGY | Facility: MEDICAL CENTER | Age: 47
End: 2017-03-03
Payer: COMMERCIAL

## 2017-04-21 ENCOUNTER — OFFICE VISIT (OUTPATIENT)
Dept: URGENT CARE | Facility: PHYSICIAN GROUP | Age: 47
End: 2017-04-21
Payer: COMMERCIAL

## 2017-04-21 VITALS
WEIGHT: 260 LBS | HEART RATE: 75 BPM | BODY MASS INDEX: 43.27 KG/M2 | DIASTOLIC BLOOD PRESSURE: 74 MMHG | TEMPERATURE: 98.8 F | OXYGEN SATURATION: 99 % | RESPIRATION RATE: 14 BRPM | SYSTOLIC BLOOD PRESSURE: 126 MMHG

## 2017-04-21 DIAGNOSIS — R82.90 FOUL SMELLING URINE: ICD-10-CM

## 2017-04-21 DIAGNOSIS — N89.8 VAGINAL ITCHING: ICD-10-CM

## 2017-04-21 LAB
APPEARANCE UR: CLEAR
BILIRUB UR STRIP-MCNC: NORMAL MG/DL
COLOR UR AUTO: NORMAL
GLUCOSE BLD-MCNC: 93 MG/DL (ref 70–100)
GLUCOSE UR STRIP.AUTO-MCNC: NORMAL MG/DL
KETONES UR STRIP.AUTO-MCNC: NORMAL MG/DL
LEUKOCYTE ESTERASE UR QL STRIP.AUTO: NORMAL
NITRITE UR QL STRIP.AUTO: NORMAL
PH UR STRIP.AUTO: 7.5 [PH] (ref 5–8)
PROT UR QL STRIP: NORMAL MG/DL
RBC UR QL AUTO: NORMAL
SP GR UR STRIP.AUTO: 1
UROBILINOGEN UR STRIP-MCNC: NORMAL MG/DL

## 2017-04-21 PROCEDURE — 82962 GLUCOSE BLOOD TEST: CPT | Performed by: NURSE PRACTITIONER

## 2017-04-21 PROCEDURE — 81002 URINALYSIS NONAUTO W/O SCOPE: CPT | Performed by: NURSE PRACTITIONER

## 2017-04-21 PROCEDURE — 99214 OFFICE O/P EST MOD 30 MIN: CPT | Performed by: NURSE PRACTITIONER

## 2017-04-21 RX ORDER — FLUCONAZOLE 150 MG/1
150 TABLET ORAL DAILY
Qty: 1 TAB | Refills: 0 | Status: SHIPPED | OUTPATIENT
Start: 2017-04-21 | End: 2017-09-14

## 2017-04-21 NOTE — MR AVS SNAPSHOT
Demetra Castanonfidelia   2017 9:15 AM   Office Visit   MRN: [id number]    Department:  [facility]   Dept Phone:  [phone]    Description:  Female : 1970   Provider:  RUSTY Forrester           Reason for Visit     Other foul smelling urine x 2 weeks      Allergies as of 2017     No Known Allergies      You were diagnosed with     Foul smelling urine   [348656]       Vaginal itching   [300199]         Vital Signs     Blood Pressure Pulse Temperature Respirations Weight Oxygen Saturation    126/74 mmHg 75 37.1 °C (98.8 °F) 14 117.935 kg (260 lb) 99%    Smoking Status                   Former Smoker           Basic Information     Date Of Birth Sex Race Ethnicity Preferred Language    1970 Female White Non- English      Problem List              ICD-10-CM Priority Class Noted - Resolved    Hypothyroidism E03.9   2013 - Present    Meniere's disease    2013 - Present    Vertigo R42   2013 - Present    GERD (gastroesophageal reflux disease) K21.9   2013 - Present    Sinus pressure J34.89   3/3/2014 - Present    Arnold-Chiari malformation, type I (CMS-HCC) G93.5   2016 - Present    Intractable migraine without aura and without status migrainosus G43.019   2016 - Present    Chronic headaches R51   Unknown - Present    Anxiety disorder F41.9   3/1/2017 - Present      Health Maintenance        Date Due Completion Dates    IMM DTaP/Tdap/Td Vaccine (1 - Tdap) 3/29/1991 ---    MAMMOGRAM 3/29/2012 ---    PAP SMEAR 2017 (N/S)    Override on 2014: (N/S) (dr benoit)            Results     POCT Urinalysis      Component Value Standard Range & Units    POC Color Lt Yellow Negative    POC Appearance clear Negative    POC Leukocyte Esterase neg Negative    POC Nitrites neg Negative    POC Urobiligen neg Negative (0.2) mg/dL    POC Protein neg Negative mg/dL    POC Urine PH 7.5 5.0 - 8.0    POC Blood 3+ Negative    POC Specific  Gravity 1.005 <1.005 - >1.030    POC Ketones neg Negative mg/dL    POC Biliruben neg Negative mg/dL    POC Glucose neg Negative mg/dL                POCT Glucose      Component Value Standard Range & Units    Glucose - Accu-Ck 93 70 - 100 mg/dL                        Current Immunizations     Influenza TIV (IM) 10/22/2013      Below and/or attached are the medications your provider expects you to take. Review all of your home medications and newly ordered medications with your provider and/or pharmacist. Follow medication instructions as directed by your provider and/or pharmacist. Please keep your medication list with you and share with your provider. Update the information when medications are discontinued, doses are changed, or new medications (including over-the-counter products) are added; and carry medication information at all times in the event of emergency situations     Allergies:  No Known Allergies          Medications  Valid as of: April 21, 2017 -  9:53 AM    Generic Name Brand Name Tablet Size Instructions for use    DiazePAM (Tab) VALIUM 5 MG Take 1 Tab by mouth every 6 hours as needed (dizziness).        Fluconazole (Tab) DIFLUCAN 150 MG Take 1 Tab by mouth every day.        Folinic Acid-Vit B6-Vit B12   Take  by mouth.        Levothyroxine Sodium (Tab) SYNTHROID 125 MCG Take 62.5 mcg by mouth Every morning on an empty stomach.        Magnesium (Tab) Magnesium 250 MG Take  by mouth.        .                 Medicines prescribed today were sent to:     [facility] #[id number] - [address] AT [address] [address]    Phone: [phone] Fax: [phone]    Open 24 Hours?: No      Medication refill instructions:       If your prescription bottle indicates you have medication refills left, it is not necessary to call your provider’s office. Please contact your pharmacy and they will refill your medication.    If your prescription bottle indicates you do not have  any refills left, you may request refills at any time through one of the following ways: The online Numerous system (except Urgent Care), by calling your provider’s office, or by asking your pharmacy to contact your provider’s office with a refill request. Medication refills are processed only during regular business hours and may not be available until the next business day. Your provider may request additional information or to have a follow-up visit with you prior to refilling your medication.   *Please Note: Medication refills are assigned a new Rx number when refilled electronically. Your pharmacy may indicate that no refills were authorized even though a new prescription for the same medication is available at the pharmacy. Please request the medicine by name with the pharmacy before contacting your provider for a refill.           Numerous Access Code: [email or web address]  Expires: 4/22/2017 11:25 AM    Numerous  A secure, online tool to manage your health information     [employer]’s Numerous® is a secure, online tool that connects you to your personalized health information from the privacy of your home -- day or night - making it very easy for you to manage your healthcare. Once the activation process is completed, you can even access your medical information using the Numerous luz, which is available for free in the Apple Luz store or Google Play store.     Numerous provides the following levels of access (as shown below):   My Chart Features   [facility] Doctor [facility]  Urgent  Care Non-[facility]  Primary Care  Doctor   Email your healthcare team securely and privately 24/7 X X X    Manage appointments: schedule your next appointment; view details of past/upcoming appointments X      Request prescription refills. X      View recent personal medical records, including lab and immunizations X X X X   View health record, including health history, allergies, medications X X X X   Read  reports about your outpatient visits, procedures, consult and ER notes X X X X   See your discharge summary, which is a recap of your hospital and/or ER visit that includes your diagnosis, lab results, and care plan. X X       How to register for ColoraderdamÂ®:  1. Go to  [email or web address].  2. Click on the Sign Up Now box, which takes you to the New Member Sign Up page. You will need to provide the following information:  a. Enter your ColoraderdamÂ® Access Code exactly as it appears at the top of this page. (You will not need to use this code after you’ve completed the sign-up process. If you do not sign up before the expiration date, you must request a new code.)   b. Enter your date of birth.   c. Enter your home email address.   d. Click Submit, and follow the next screen’s instructions.  3. Create a ColoraderdamÂ® ID. This will be your ColoraderdamÂ® login ID and cannot be changed, so think of one that is secure and easy to remember.  4. Create a ColoraderdamÂ® password. You can change your password at any time.  5. Enter your Password Reset Question and Answer. This can be used at a later time if you forget your password.   6. Enter your e-mail address. This allows you to receive e-mail notifications when new information is available in ColoraderdamÂ®.  7. Click Sign Up. You can now view your health information.    For assistance activating your ColoraderdamÂ® account, call [phone]

## 2017-04-21 NOTE — PROGRESS NOTES
Chief Complaint   Patient presents with   • Other     foul smelling urine x 2 weeks       HISTORY OF PRESENT ILLNESS: Patient is a 47 y.o. female who presents today due to concerns of foul smelling urine for the past week. She denies urinary frequency, urgency, dysuria. She denies hematuria, flank pain, nausea, vomiting, fever, chills. She does report some mild vaginal itching. Denies any abnormal vaginal discharge. She is currently menstruating. She is in a monogamous relationship, denies chance for STIS. She has not taken any medication for symptom relief. Her last Pap smear was 2 years ago, denies history of abnormal Paps. She denies a history of diabetes, is requesting a fingerstick today to rule out. She has not seen her PCP or received basic lab work recently.      Patient Active Problem List    Diagnosis Date Noted   • Anxiety disorder 03/01/2017   • Chronic headaches    • Arnold-Chiari malformation, type I (CMS-HCC) 04/21/2016   • Intractable migraine without aura and without status migrainosus 04/21/2016   • Sinus pressure 03/03/2014   • Hypothyroidism 09/13/2013   • Meniere's disease 09/13/2013   • Vertigo 09/13/2013   • GERD (gastroesophageal reflux disease) 09/13/2013       Allergies:Review of patient's allergies indicates no known allergies.    Current Outpatient Prescriptions Ordered in [facility]   Medication Sig Dispense Refill   • FOLINIC ACID-VIT B6-VIT B12 PO Take  by mouth.     • levothyroxine (SYNTHROID) 125 MCG Tab Take 62.5 mcg by mouth Every morning on an empty stomach.     • Magnesium 250 MG Tab Take  by mouth.     • diazepam (VALIUM) 5 MG TABS Take 1 Tab by mouth every 6 hours as needed (dizziness). 15 Each 0     No current [facility]-ordered facility-administered medications on file.       Past Medical History   Diagnosis Date   • Thyroid disease    • GERD (gastroesophageal reflux disease)    • Arnold-Chiari malformation (CMS-[facility])    • Hypothyroidism    • Chronic headaches        Social History    Substance Use Topics   • Smoking status: Former Smoker -- 0.50 packs/day for 18 years     Types: Cigarettes     Quit date: 08/16/2012   • Smokeless tobacco: Never Used      Comment: quit 8/18/12   • Alcohol Use: 0.0 oz/week     0 Standard drinks or equivalent per week      Comment: social drinking       Family Status   Relation Status Death Age   • Mother Alive    • Father Alive    • Sister Alive    • Sister Alive      Family History   Problem Relation Age of Onset   • Thyroid Mother    • Lung Disease Father    • GI Father      ulcerative colitis   • Arthritis Father      gout   • Thyroid Sister    • Other Sister      MS   • Thyroid Sister        ROS:  Review of Systems   Constitutional: Negative for fever, chills, weight loss, malaise, and fatigue.   HENT: Negative for ear pain, nosebleeds, congestion, sore throat and neck pain.    Neuro: Negative for headache, sensory changes, weakness, seizure, LOC.   Cardiovascular: Negative for chest pain, palpitations, orthopnea and leg swelling.   Respiratory: Negative for cough, sputum production, shortness of breath and wheezing.   Gastrointestinal: Negative for abdominal pain, nausea, vomiting or diarrhea.   Genitourinary: Positive for foul-smelling urine. Negative for hematuria, dysuria, urgency and frequency.  GYN: Positive for vaginal itching. Negative for abnormal vaginal discharge.  Musculoskeletal: Negative for falls, neck pain, back pain, joint pain, myalgias.   Skin: Negative for rash, diaphoresis.     Exam:  Blood pressure 126/74, pulse 75, temperature 37.1 °C (98.8 °F), resp. rate 14, weight 117.935 kg (260 lb), SpO2 99 %.  General: well-nourished, well-developed female in NAD  Head: normocephalic, atraumatic  Eyes: PERRLA, no conjunctival injection, acuity grossly intact, lids normal.  Ears: normal shape and symmetry, gross auditory acuity is intact.  Nose: symmetrical without tenderness, no discharge.  Mouth/Throat: reasonable hygiene, no erythema, exudates  or tonsillar enlargement.  Neck: no masses, range of motion within normal limits, no tracheal deviation. No obvious thyroid enlargement.   Lymph: no cervical adenopathy. No supraclavicular adenopathy.   Neuro: alert and oriented. Cranial nerves 1-12 grossly intact. No sensory deficit.   Cardiovascular: regular rate and rhythm. No edema.  Pulmonary: no distress. Chest is symmetrical with respiration, no wheezes, crackles, or rhonchi.   Abdomen: soft, non-tender, no guarding, no hepatosplenomegaly.  GYN: patient declined vaginal exam  Musculoskeletal: no clubbing, appropriate muscle tone, gait is stable.  Skin: warm, dry, intact, no clubbing, no cyanosis, no rashes.   Psych: Rapid speech, appears anxious. Appropriate judgment.         Assessment/Plan:  1. Foul smelling urine  POCT Urinalysis    fluconazole (DIFLUCAN) 150 MG tablet   2. Vaginal itching  fluconazole (DIFLUCAN) 150 MG tablet         POC glucose 93      Urine dip negative for infectious process. Glucose in office 93, normal. Diflucan as directed for suspected candida. Increase fluid intake. Follow up with OBGYN.   Supportive care, differential diagnoses, and indications for immediate follow-up discussed with patient.   Pathogenesis of diagnosis discussed including typical length and natural progression.   Instructed to return to clinic or nearest emergency department for any change in condition, further concerns, or worsening of symptoms.  Patient states understanding of the plan of care and discharge instructions.  Instructed to make an appointment, for follow up, with their primary care provider.        Please note that this dictation was created using voice recognition software. I have made every reasonable attempt to correct obvious errors, but I expect that there are errors of grammar and possibly content that I did not discover before finalizing the note.      JOSHUA Nava.

## 2017-06-01 ENCOUNTER — HOSPITAL ENCOUNTER (OUTPATIENT)
Dept: RADIOLOGY | Facility: MEDICAL CENTER | Age: 47
End: 2017-06-01
Attending: FAMILY MEDICINE
Payer: COMMERCIAL

## 2017-06-01 DIAGNOSIS — Z00.00 WELL ADULT EXAM: ICD-10-CM

## 2017-06-01 PROCEDURE — 306723 HCHG VASCULAR SCREENING

## 2017-06-01 PROCEDURE — 93922 UPR/L XTREMITY ART 2 LEVELS: CPT

## 2017-06-01 PROCEDURE — 93922 UPR/L XTREMITY ART 2 LEVELS: CPT | Mod: 26 | Performed by: SURGERY

## 2017-06-08 ENCOUNTER — HOSPITAL ENCOUNTER (OUTPATIENT)
Dept: RADIOLOGY | Facility: MEDICAL CENTER | Age: 47
End: 2017-06-08
Attending: SURGERY
Payer: COMMERCIAL

## 2017-06-08 DIAGNOSIS — K43.6: ICD-10-CM

## 2017-06-08 PROCEDURE — 700117 HCHG RX CONTRAST REV CODE 255: Performed by: SURGERY

## 2017-06-08 PROCEDURE — 74160 CT ABDOMEN W/CONTRAST: CPT

## 2017-06-08 RX ADMIN — IOHEXOL 100 ML: 350 INJECTION, SOLUTION INTRAVENOUS at 10:45

## 2017-06-20 ENCOUNTER — HOSPITAL ENCOUNTER (OUTPATIENT)
Facility: MEDICAL CENTER | Age: 47
End: 2017-06-20
Attending: SURGERY | Admitting: SURGERY
Payer: COMMERCIAL

## 2017-07-31 ENCOUNTER — NON-PROVIDER VISIT (OUTPATIENT)
Dept: CARDIOLOGY | Facility: MEDICAL CENTER | Age: 47
End: 2017-07-31
Payer: COMMERCIAL

## 2017-07-31 DIAGNOSIS — R00.2 PALPITATIONS: ICD-10-CM

## 2017-07-31 DIAGNOSIS — R00.0 SINUS TACHYCARDIA: ICD-10-CM

## 2017-07-31 DIAGNOSIS — R00.1 SINUS BRADYCARDIA: ICD-10-CM

## 2017-08-02 DIAGNOSIS — R00.2 PALPITATIONS: ICD-10-CM

## 2017-08-03 LAB — EKG IMPRESSION: NORMAL

## 2017-08-03 PROCEDURE — 93224 XTRNL ECG REC UP TO 48 HRS: CPT | Performed by: INTERNAL MEDICINE

## 2017-08-31 DIAGNOSIS — R00.2 PALPITATIONS: ICD-10-CM

## 2017-09-14 DIAGNOSIS — Z01.811 PRE-OPERATIVE RESPIRATORY EXAMINATION: ICD-10-CM

## 2017-09-14 DIAGNOSIS — Z01.812 PRE-OPERATIVE LABORATORY EXAMINATION: ICD-10-CM

## 2017-09-14 DIAGNOSIS — Z01.810 PRE-OPERATIVE CARDIOVASCULAR EXAMINATION: ICD-10-CM

## 2017-09-14 LAB
ANION GAP SERPL CALC-SCNC: 8 MMOL/L (ref 0–11.9)
BASOPHILS # BLD AUTO: 1.2 % (ref 0–1.8)
BASOPHILS # BLD: 0.07 K/UL (ref 0–0.12)
BUN SERPL-MCNC: 10 MG/DL (ref 8–22)
CALCIUM SERPL-MCNC: 9.2 MG/DL (ref 8.5–10.5)
CHLORIDE SERPL-SCNC: 106 MMOL/L (ref 96–112)
CO2 SERPL-SCNC: 25 MMOL/L (ref 20–33)
CREAT SERPL-MCNC: 0.78 MG/DL (ref 0.5–1.4)
EKG IMPRESSION: NORMAL
EOSINOPHIL # BLD AUTO: 0.12 K/UL (ref 0–0.51)
EOSINOPHIL NFR BLD: 2 % (ref 0–6.9)
ERYTHROCYTE [DISTWIDTH] IN BLOOD BY AUTOMATED COUNT: 41.7 FL (ref 35.9–50)
GFR SERPL CREATININE-BSD FRML MDRD: >60 ML/MIN/1.73 M 2
GLUCOSE SERPL-MCNC: 84 MG/DL (ref 65–99)
HCT VFR BLD AUTO: 40 % (ref 37–47)
HGB BLD-MCNC: 13.3 G/DL (ref 12–16)
IMM GRANULOCYTES # BLD AUTO: 0.03 K/UL (ref 0–0.11)
IMM GRANULOCYTES NFR BLD AUTO: 0.5 % (ref 0–0.9)
LYMPHOCYTES # BLD AUTO: 1.97 K/UL (ref 1–4.8)
LYMPHOCYTES NFR BLD: 33.4 % (ref 22–41)
MCH RBC QN AUTO: 29 PG (ref 27–33)
MCHC RBC AUTO-ENTMCNC: 33.3 G/DL (ref 33.6–35)
MCV RBC AUTO: 87.3 FL (ref 81.4–97.8)
MONOCYTES # BLD AUTO: 0.24 K/UL (ref 0–0.85)
MONOCYTES NFR BLD AUTO: 4.1 % (ref 0–13.4)
NEUTROPHILS # BLD AUTO: 3.46 K/UL (ref 2–7.15)
NEUTROPHILS NFR BLD: 58.8 % (ref 44–72)
NRBC # BLD AUTO: 0 K/UL
NRBC BLD AUTO-RTO: 0 /100 WBC
PLATELET # BLD AUTO: 206 K/UL (ref 164–446)
PMV BLD AUTO: 11.3 FL (ref 9–12.9)
POTASSIUM SERPL-SCNC: 4 MMOL/L (ref 3.6–5.5)
RBC # BLD AUTO: 4.58 M/UL (ref 4.2–5.4)
SODIUM SERPL-SCNC: 139 MMOL/L (ref 135–145)
WBC # BLD AUTO: 5.9 K/UL (ref 4.8–10.8)

## 2017-09-14 PROCEDURE — 85025 COMPLETE CBC W/AUTO DIFF WBC: CPT

## 2017-09-14 PROCEDURE — 93010 ELECTROCARDIOGRAM REPORT: CPT | Performed by: INTERNAL MEDICINE

## 2017-09-14 PROCEDURE — 93005 ELECTROCARDIOGRAM TRACING: CPT

## 2017-09-14 PROCEDURE — 36415 COLL VENOUS BLD VENIPUNCTURE: CPT

## 2017-09-14 PROCEDURE — 80048 BASIC METABOLIC PNL TOTAL CA: CPT

## 2017-09-14 RX ORDER — FLUTICASONE PROPIONATE 50 MCG
1 SPRAY, SUSPENSION (ML) NASAL DAILY
COMMUNITY

## 2017-09-14 RX ORDER — OMEPRAZOLE 20 MG/1
20 CAPSULE, DELAYED RELEASE ORAL DAILY
COMMUNITY

## 2017-09-14 RX ORDER — FLUOXETINE HYDROCHLORIDE 20 MG/1
20 CAPSULE ORAL DAILY
COMMUNITY

## 2017-09-14 RX ORDER — CALCIUM CARBONATE 500 MG/1
500 TABLET, CHEWABLE ORAL DAILY
COMMUNITY

## 2017-10-02 ENCOUNTER — HOSPITAL ENCOUNTER (OUTPATIENT)
Facility: MEDICAL CENTER | Age: 47
End: 2017-10-02
Attending: SURGERY | Admitting: SURGERY
Payer: COMMERCIAL

## 2017-10-02 VITALS
HEIGHT: 65 IN | DIASTOLIC BLOOD PRESSURE: 58 MMHG | BODY MASS INDEX: 43.82 KG/M2 | WEIGHT: 263.01 LBS | HEART RATE: 70 BPM | RESPIRATION RATE: 18 BRPM | OXYGEN SATURATION: 100 % | SYSTOLIC BLOOD PRESSURE: 117 MMHG | TEMPERATURE: 97.4 F

## 2017-10-02 PROBLEM — K43.9 HERNIA, VENTRAL: Status: ACTIVE | Noted: 2017-10-02

## 2017-10-02 LAB
HCG UR QL: NEGATIVE
SP GR UR REFRACTOMETRY: 1.03

## 2017-10-02 PROCEDURE — 700111 HCHG RX REV CODE 636 W/ 250 OVERRIDE (IP)

## 2017-10-02 PROCEDURE — 160042 HCHG SURGERY MINUTES - EA ADDL 1 MIN LEVEL 5: Performed by: SURGERY

## 2017-10-02 PROCEDURE — 160025 RECOVERY II MINUTES (STATS): Performed by: SURGERY

## 2017-10-02 PROCEDURE — 502240 HCHG MISC OR SUPPLY RC 0272: Performed by: SURGERY

## 2017-10-02 PROCEDURE — 501486 HCHG STRYKER IRRIG SET HC W/TUBING: Performed by: SURGERY

## 2017-10-02 PROCEDURE — 500868 HCHG NEEDLE, SURGI(VARES): Performed by: SURGERY

## 2017-10-02 PROCEDURE — 160031 HCHG SURGERY MINUTES - 1ST 30 MINS LEVEL 5: Performed by: SURGERY

## 2017-10-02 PROCEDURE — 160047 HCHG PACU  - EA ADDL 30 MINS PHASE II: Performed by: SURGERY

## 2017-10-02 PROCEDURE — 500864 HCHG NEEDLE, SPINAL 18G: Performed by: SURGERY

## 2017-10-02 PROCEDURE — 700102 HCHG RX REV CODE 250 W/ 637 OVERRIDE(OP)

## 2017-10-02 PROCEDURE — 502714 HCHG ROBOTIC SURGERY SERVICES: Performed by: SURGERY

## 2017-10-02 PROCEDURE — 501570 HCHG TROCAR, SEPARATOR: Performed by: SURGERY

## 2017-10-02 PROCEDURE — 160036 HCHG PACU - EA ADDL 30 MINS PHASE I: Performed by: SURGERY

## 2017-10-02 PROCEDURE — C1781 MESH (IMPLANTABLE): HCPCS | Performed by: SURGERY

## 2017-10-02 PROCEDURE — 500002 HCHG ADHESIVE, DERMABOND: Performed by: SURGERY

## 2017-10-02 PROCEDURE — A9270 NON-COVERED ITEM OR SERVICE: HCPCS

## 2017-10-02 PROCEDURE — 160048 HCHG OR STATISTICAL LEVEL 1-5: Performed by: SURGERY

## 2017-10-02 PROCEDURE — 160009 HCHG ANES TIME/MIN: Performed by: SURGERY

## 2017-10-02 PROCEDURE — 160035 HCHG PACU - 1ST 60 MINS PHASE I: Performed by: SURGERY

## 2017-10-02 PROCEDURE — 160046 HCHG PACU - 1ST 60 MINS PHASE II: Performed by: SURGERY

## 2017-10-02 PROCEDURE — 700101 HCHG RX REV CODE 250: Mod: JW

## 2017-10-02 PROCEDURE — 503366 HCHG TROCAR, 12X150 KII FIOS Z THR: Performed by: SURGERY

## 2017-10-02 PROCEDURE — 160002 HCHG RECOVERY MINUTES (STAT): Performed by: SURGERY

## 2017-10-02 PROCEDURE — 700101 HCHG RX REV CODE 250

## 2017-10-02 PROCEDURE — 81025 URINE PREGNANCY TEST: CPT

## 2017-10-02 PROCEDURE — 501838 HCHG SUTURE GENERAL: Performed by: SURGERY

## 2017-10-02 DEVICE — MESH VENTRALIGHT ST 4.5IN 1EA/CA: Type: IMPLANTABLE DEVICE | Status: FUNCTIONAL

## 2017-10-02 RX ORDER — LIDOCAINE HYDROCHLORIDE 10 MG/ML
INJECTION, SOLUTION INFILTRATION; PERINEURAL
Status: COMPLETED
Start: 2017-10-02 | End: 2017-10-02

## 2017-10-02 RX ORDER — DEXAMETHASONE SODIUM PHOSPHATE 4 MG/ML
4 INJECTION, SOLUTION INTRA-ARTICULAR; INTRALESIONAL; INTRAMUSCULAR; INTRAVENOUS; SOFT TISSUE
Status: DISCONTINUED | OUTPATIENT
Start: 2017-10-02 | End: 2017-10-02 | Stop reason: HOSPADM

## 2017-10-02 RX ORDER — DIPHENHYDRAMINE HYDROCHLORIDE 50 MG/ML
25 INJECTION INTRAMUSCULAR; INTRAVENOUS EVERY 6 HOURS PRN
Status: DISCONTINUED | OUTPATIENT
Start: 2017-10-02 | End: 2017-10-02 | Stop reason: HOSPADM

## 2017-10-02 RX ORDER — LIDOCAINE AND PRILOCAINE 25; 25 MG/G; MG/G
1 CREAM TOPICAL
Status: COMPLETED | OUTPATIENT
Start: 2017-10-02 | End: 2017-10-02

## 2017-10-02 RX ORDER — HALOPERIDOL 5 MG/ML
1 INJECTION INTRAMUSCULAR EVERY 6 HOURS PRN
Status: DISCONTINUED | OUTPATIENT
Start: 2017-10-02 | End: 2017-10-02 | Stop reason: HOSPADM

## 2017-10-02 RX ORDER — BUPIVACAINE HYDROCHLORIDE AND EPINEPHRINE 5; 5 MG/ML; UG/ML
INJECTION, SOLUTION EPIDURAL; INTRACAUDAL; PERINEURAL
Status: DISCONTINUED | OUTPATIENT
Start: 2017-10-02 | End: 2017-10-02 | Stop reason: HOSPADM

## 2017-10-02 RX ORDER — HALOPERIDOL 5 MG/ML
INJECTION INTRAMUSCULAR
Status: COMPLETED
Start: 2017-10-02 | End: 2017-10-02

## 2017-10-02 RX ORDER — ONDANSETRON 2 MG/ML
INJECTION INTRAMUSCULAR; INTRAVENOUS
Status: COMPLETED
Start: 2017-10-02 | End: 2017-10-02

## 2017-10-02 RX ORDER — SCOLOPAMINE TRANSDERMAL SYSTEM 1 MG/1
1 PATCH, EXTENDED RELEASE TRANSDERMAL
Status: DISCONTINUED | OUTPATIENT
Start: 2017-10-02 | End: 2017-10-02 | Stop reason: HOSPADM

## 2017-10-02 RX ORDER — SODIUM CHLORIDE, SODIUM LACTATE, POTASSIUM CHLORIDE, CALCIUM CHLORIDE 600; 310; 30; 20 MG/100ML; MG/100ML; MG/100ML; MG/100ML
INJECTION, SOLUTION INTRAVENOUS CONTINUOUS
Status: DISCONTINUED | OUTPATIENT
Start: 2017-10-02 | End: 2017-10-02 | Stop reason: HOSPADM

## 2017-10-02 RX ORDER — OXYCODONE HCL 5 MG/5 ML
SOLUTION, ORAL ORAL
Status: COMPLETED
Start: 2017-10-02 | End: 2017-10-02

## 2017-10-02 RX ORDER — ONDANSETRON 2 MG/ML
4 INJECTION INTRAMUSCULAR; INTRAVENOUS EVERY 4 HOURS PRN
Status: DISCONTINUED | OUTPATIENT
Start: 2017-10-02 | End: 2017-10-02 | Stop reason: HOSPADM

## 2017-10-02 RX ORDER — LIDOCAINE HYDROCHLORIDE 10 MG/ML
0.5 INJECTION, SOLUTION INFILTRATION; PERINEURAL
Status: COMPLETED | OUTPATIENT
Start: 2017-10-02 | End: 2017-10-02

## 2017-10-02 RX ADMIN — LIDOCAINE HYDROCHLORIDE 0.5 ML: 10 INJECTION, SOLUTION INFILTRATION; PERINEURAL at 08:40

## 2017-10-02 RX ADMIN — HALOPERIDOL LACTATE 1 MG: 5 INJECTION, SOLUTION INTRAMUSCULAR at 12:45

## 2017-10-02 RX ADMIN — SODIUM CHLORIDE, SODIUM LACTATE, POTASSIUM CHLORIDE, CALCIUM CHLORIDE: 600; 310; 30; 20 INJECTION, SOLUTION INTRAVENOUS at 08:40

## 2017-10-02 RX ADMIN — ONDANSETRON 4 MG: 2 INJECTION INTRAMUSCULAR; INTRAVENOUS at 12:30

## 2017-10-02 RX ADMIN — OXYCODONE HYDROCHLORIDE 5 MG: 5 SOLUTION ORAL at 12:30

## 2017-10-02 ASSESSMENT — PAIN SCALES - GENERAL
PAINLEVEL_OUTOF10: 2
PAINLEVEL_OUTOF10: 2
PAINLEVEL_OUTOF10: 3
PAINLEVEL_OUTOF10: 2
PAINLEVEL_OUTOF10: 0
PAINLEVEL_OUTOF10: 5

## 2017-10-02 NOTE — OR SURGEON
Operative Report    PreOp Diagnosis: Ventral hernia    PostOp Diagnosis: Ventral hernia    Procedure(s):  VENTRAL HERNIA REPAIR ROBOTIC- WITH MESH - Wound Class: Clean    Surgeon(s):  INDU Canela M.D.    Anesthesiologist/Type of Anesthesia:  Anesthesiologist: Juan Manuel Dexter M.D./General    Surgical Staff:  Circulator: Iza Starkey R.N.  Relief Circulator: Colleen Hawthorne R.N.  Relief Scrub: Rafael Landon R.N.; Radha Gilliland  Scrub Person: Brooks Dunbar    Specimens:  None    Estimated Blood Loss: 20 ml    Findings: 2 cm ventral hernia defect with large amount of preperitoneal fat herniated through the defect.  Obesity    Complications: None        10/2/2017 12:03 PM Brooks Aldana

## 2017-10-02 NOTE — OR NURSING
Pt reports nausae after zofran. . Haldol iv given. Sips of sprite. Encourage inspirometer. [id number] cc.95% 2l nc.

## 2017-10-02 NOTE — OP REPORT
DATE OF SERVICE:  10/02/2017    PREOPERATIVE DIAGNOSIS:  Ventral hernia.    POSTOPERATIVE DIAGNOSIS:  Ventral hernia.    INDICATION FOR SURGERY:  This is a 47-year-old obese female with a   longstanding ventral hernia proven by CT scan.  She was brought to the   operating room today for a planned robotic-assisted laparoscopic ventral   hernia repair with mesh.    PROCEDURE PERFORMED:  Robotic-assisted laparoscopic ventral hernia repair with   mesh.    SURGEON:  Brooks Aldana MD    ASSISTANT:  Chanel Jang MD    ANESTHESIOLOGIST:  Juan Manuel Dexter MD    ANESTHESIA:  General endotracheal anesthesia.    FINDINGS:  A 2 cm ventral hernia defect just above the umbilicus.  Obesity.    Large amount of preperitoneal fat herniated through the defect.    PROCEDURE NARRATIVE:  Signed informed consent was on the chart at the time of   the procedure.  The patient was brought to the operating room and placed in   the supine position.  General endotracheal anesthesia was induced.  Skin over   the abdomen was prepped and draped in a sterile fashion.  The patient was   administered 2 grams of Ancef IV preoperatively.  A timeout was performed   after first infusing the skin and soft tissue with local anesthetic, which in   this case was 0.5% Marcaine with epinephrine.  A 1 cm horizontal incision was   made in the left upper quadrant.  Through this incision was placed a Visiport   with the 5 mm camera within the Visiport and working as the tissue layers were   divided.  Once the Visiport was in place, pneumoperitoneum was achieved   without difficulty.  Using the 5 mm camera, the underlying viscus was   inspected, no evidence of trauma was appreciated.  After first infusing the   skin and soft tissue with local anesthetic, a 2 cm horizontal incision was   made in the left lateral abdominal wall through which a 12 mm port was placed   under direct visualization via the camera.  Using similar technique, a 1 cm   incision  was made in the left lower quadrant through which an 8 da Gerry port   was placed under direct visualization via the camera.  The 5 mm port was then   replaced with an 8 mm da Gerry port under direct visualization via the camera.    The robot was undocked to the da Gerry ports and the 12 mm port and I moved   to the surgeon's station.  Using the laparoscopic ari, the peritoneum was   divided approximately 4 cm away from the edge of the hernia defect on the   patient's left lateral abdominal wall.  The fat and peritoneum was then   dissected off of the anterior abdominal wall.  When the hernia defect was   reached, there was noted to be a large amount of preperitoneal fat herniated   through the defect.  This was carefully reduced, was noted to be some bleeding   in this area.  Eventually, in order to confirm that the bleeding had been   stopped, a fourth port was placed.  This was a 5 mm laparoscopic port through   which the Nezhat  was able to be placed.  Irrigation of the area   revealed that the hemostasis was in fact intact.  The dissection was carried   out until there was approximately 3 cm cleared around the hernia defect.  A   Stratafix suture was then used to close the hernia defect placing these   sutures in a continuous fashion under direct visualization.  The needle was   left in place and the suture was left hanging.  A piece of Ventralight mesh   was modified to a 6 cm round diameter piece and marked for orientation.  This   was placed into the peritoneal space and then tacked up over the hernia defect   using the previously placed Stratafix suture, placing through the mesh and   making sure that the mesh was oriented appropriately.  The mesh was then   further secured in place with 2 separately placed running 0 Ethibond sutures   completely obliterating any space between the edges of the mesh and the   abdominal wall circumferentially.  The needles from these 2 structures were   stowed in  the anterior abdominal wall.  The previously placed Stratafix suture   was cut at the level of the mesh and that suture needle was stowed in the   anterior abdominal wall.  A second Stratafix suture was then placed into the   peritoneal space and this was used to reapproximate the fat and peritoneum   over the mesh.  Once the peritoneum was reapproximated and secured, that   suture was cut and the suture needle was stowed in the anterior abdominal   wall.  The robot arms were then disconnected from the ports and we used the   laparoscopic instruments and camera to remove all 4 of the needles using the   12 mm port as access.  The 12 mm port was then removed and using an Endoclose   device under direct visualization via the camera, 2 separate 0 Ethibond   sutures were placed across the fascial defect and then tightened and tied   being careful not to include any underlying structures.  The 5 mm port and the   8 mm left lower quadrant port were removed and no evidence of bleeding was   noted at either site.  The 8 mm left upper quadrant port was opened and was   removed as pneumoperitoneum was allowed to .  Each of the 4 incisions   was irrigated and dried and each was closed using a running 4-0 Vicryl   subcuticular stitch.  The incisions were all dressed with clear Tegaderm   dressings.    FLUIDS:  1100 mL crystalloid.    ESTIMATED BLOOD LOSS:  20 mL.    DRAINS:  None.    SPECIMENS:  None.    COMPLICATIONS:  None.    DISPOSITION:  The patient was in stable condition in the postanesthesia care   unit.       ____________________________________     MD AIDE Mcgee / HUONG    DD:  10/02/2017 12:12:29  DT:  10/02/2017 14:10:19    D#:  [id number]  Job#:  [id number]

## 2017-10-02 NOTE — DISCHARGE INSTRUCTIONS
ACTIVITY: Rest and take it easy for the first 24 hours.  A responsible adult is recommended to remain with you during that time.  It is normal to feel sleepy.  We encourage you to not do anything that requires balance, judgment or coordination.    MILD FLU-LIKE SYMPTOMS ARE NORMAL. YOU MAY EXPERIENCE GENERALIZED MUSCLE ACHES, THROAT IRRITATION, HEADACHE AND/OR SOME NAUSEA.    FOR 24 HOURS DO NOT:  Drive, operate machinery or run household appliances.  Drink beer or alcoholic beverages.   Make important decisions or sign legal documents.    SPECIAL INSTRUCTIONS:   No lifting greater than 20 pounds for 4 weeks.   Use over the counter laxative of choice if constipated.   Follow up with Dr. Aldana ([facility], [phone]) as previously appointed, or in 10-14 days.    DIET: To avoid nausea, slowly advance diet as tolerated, avoiding spicy or greasy foods for the first day.  Add more substantial food to your diet according to your physician's instructions. INCREASE FLUIDS AND FIBER TO AVOID CONSTIPATION.    SURGICAL DRESSING/BATHING:   Remove dressing(s) and begins showering on 10/5/17, but no baths, hot tubs, or swimming until follow up appointment.     FOLLOW-UP APPOINTMENT:  A follow-up appointment should be arranged with your doctor in 10-14 days; call to schedule.    You should CALL YOUR PHYSICIAN if you develop:  Fever greater than 101 degrees F.  Pain not relieved by medication, or persistent nausea or vomiting.  Excessive bleeding (blood soaking through dressing) or unexpected drainage from the wound.  Extreme redness or swelling around the incision site, drainage of pus or foul smelling drainage.  Inability to urinate or empty your bladder within 8 hours.  Problems with breathing or chest pain.    You should call 911 if you develop problems with breathing or chest pain.  If you are unable to contact your doctor or surgical center, you should go to the nearest emergency room or urgent care  center.  Physician's telephone #: [phone]    If any questions arise, call your doctor.  If your doctor is not available, please feel free to call the Surgical Center at [phone].  The Center is open Monday through Friday from 7AM to 7PM.  You can also call the HEALTH HOTLINE open 24 hours/day, 7 days/week and speak to a nurse at [phone], or toll free at [phone].    A registered nurse may call you a few days after your surgery to see how you are doing after your procedure.    MEDICATIONS: Resume taking daily medication.  Take prescribed pain medication with food.  If no medication is prescribed, you may take non-aspirin pain medication if needed.  PAIN MEDICATION CAN BE VERY CONSTIPATING.  Take a stool softener or laxative such as senokot, pericolace, or milk of magnesia if needed.    Prescription given for *pain medication at home**.  Last pain medication given at 12:30 pm.    If your physician has prescribed pain medication that includes Acetaminophen (Tylenol), do not take additional Acetaminophen (Tylenol) while taking the prescribed medication.    Depression / Suicide Risk    As you are discharged from this [facility] facility, it is important to learn how to keep safe from harming yourself.    Recognize the warning signs:  · Abrupt changes in personality, positive or negative- including increase in energy   · Giving away possessions  · Change in eating patterns- significant weight changes-  positive or negative  · Change in sleeping patterns- unable to sleep or sleeping all the time   · Unwillingness or inability to communicate  · Depression  · Unusual sadness, discouragement and loneliness  · Talk of wanting to die  · Neglect of personal appearance   · Rebelliousness- reckless behavior  · Withdrawal from people/activities they love  · Confusion- inability to concentrate     If you or a loved one observes any of these behaviors or has concerns about self-harm, here's what you can  do:  · Talk about it- your feelings and reasons for harming yourself  · Remove any means that you might use to hurt yourself (examples: pills, rope, extension cords, firearm)  · Get professional help from the community (Mental Health, Substance Abuse, psychological counseling)  · Do not be alone:Call your Safe Contact- someone whom you trust who will be there for you.  · Call your local CRISIS HOTLINE 744-5626 or 722-039-9039  · Call your local Children's Mobile Crisis Response Team [place] (828) 579-9035 or www.[employer]  · Call the toll free National Suicide Prevention Hotlines   · National Suicide Prevention Lifeline 830-998-RQEA (3364)  · National Hope Line Network 800-SUICIDE (700-0594)

## 2017-11-30 ENCOUNTER — OFFICE VISIT (OUTPATIENT)
Dept: URGENT CARE | Facility: PHYSICIAN GROUP | Age: 47
End: 2017-11-30
Payer: COMMERCIAL

## 2017-11-30 VITALS
SYSTOLIC BLOOD PRESSURE: 128 MMHG | WEIGHT: 264 LBS | HEIGHT: 65 IN | TEMPERATURE: 98.2 F | HEART RATE: 79 BPM | RESPIRATION RATE: 16 BRPM | BODY MASS INDEX: 43.99 KG/M2 | DIASTOLIC BLOOD PRESSURE: 76 MMHG | OXYGEN SATURATION: 98 %

## 2017-11-30 DIAGNOSIS — N30.00 ACUTE CYSTITIS WITHOUT HEMATURIA: ICD-10-CM

## 2017-11-30 DIAGNOSIS — R30.0 DYSURIA: ICD-10-CM

## 2017-11-30 DIAGNOSIS — E66.01 MORBID OBESITY WITH BMI OF 40.0-44.9, ADULT (HCC): ICD-10-CM

## 2017-11-30 LAB
APPEARANCE UR: NORMAL
BILIRUB UR STRIP-MCNC: NORMAL MG/DL
COLOR UR AUTO: YELLOW
GLUCOSE UR STRIP.AUTO-MCNC: NORMAL MG/DL
KETONES UR STRIP.AUTO-MCNC: NORMAL MG/DL
LEUKOCYTE ESTERASE UR QL STRIP.AUTO: NORMAL
NITRITE UR QL STRIP.AUTO: POSITIVE
PH UR STRIP.AUTO: 7.5 [PH] (ref 5–8)
PROT UR QL STRIP: NORMAL MG/DL
RBC UR QL AUTO: NORMAL
SP GR UR STRIP.AUTO: 1.01
UROBILINOGEN UR STRIP-MCNC: NORMAL MG/DL

## 2017-11-30 PROCEDURE — 99214 OFFICE O/P EST MOD 30 MIN: CPT | Performed by: PHYSICIAN ASSISTANT

## 2017-11-30 PROCEDURE — 81002 URINALYSIS NONAUTO W/O SCOPE: CPT | Performed by: PHYSICIAN ASSISTANT

## 2017-11-30 RX ORDER — NITROFURANTOIN 25; 75 MG/1; MG/1
100 CAPSULE ORAL EVERY 12 HOURS
Qty: 10 CAP | Refills: 0 | Status: SHIPPED | OUTPATIENT
Start: 2017-11-30 | End: 2017-12-05

## 2017-11-30 ASSESSMENT — ENCOUNTER SYMPTOMS
CONSTITUTIONAL NEGATIVE: 1
NAUSEA: 0
FLANK PAIN: 0
CARDIOVASCULAR NEGATIVE: 1
ABDOMINAL PAIN: 0
VOMITING: 0
DIARRHEA: 0
RESPIRATORY NEGATIVE: 1

## 2017-11-30 NOTE — PROGRESS NOTES
Subjective:      Demetra Kendrick is a 47 y.o. female who presents with Dysuria (frequency x 2 days)            Dysuria    This is a new problem. The current episode started yesterday. The problem occurs every urination. The problem has been unchanged. The quality of the pain is described as burning. There has been no fever. The fever has been present for less than 1 day. There is no history of pyelonephritis. Associated symptoms include frequency and urgency. Pertinent negatives include no flank pain, hematuria, hesitancy, nausea or vomiting. She has tried nothing for the symptoms. The treatment provided no relief. There is no history of recurrent UTIs.       Review of Systems   Constitutional: Negative.    HENT: Negative.    Respiratory: Negative.    Cardiovascular: Negative.    Gastrointestinal: Negative for abdominal pain, diarrhea, nausea and vomiting.   Genitourinary: Positive for dysuria, frequency and urgency. Negative for flank pain, hematuria and hesitancy.       PMH:  has a past medical history of Anesthesia; Arnold-Chiari malformation (CMS-HCC); Chronic headaches; GERD (gastroesophageal reflux disease); Heart burn; Hypothyroidism; Psychiatric problem; and Thyroid disease.  MEDS:   Current Outpatient Prescriptions:   •  nitrofurantoin monohydr macro (MACROBID) 100 MG Cap, Take 1 Cap by mouth every 12 hours for 5 days., Disp: 10 Cap, Rfl: 0  •  Kelsi, Zingiber officinalis, (KELSI ROOT PO), Take 1 Tab by mouth every day., Disp: , Rfl:   •  omeprazole (PRILOSEC) 20 MG delayed-release capsule, Take 20 mg by mouth every day., Disp: , Rfl:   •  fluoxetine (PROZAC) 20 MG Cap, Take 20 mg by mouth every day., Disp: , Rfl:   •  levothyroxine (SYNTHROID) 125 MCG Tab, Take 62.5 mcg by mouth Every morning on an empty stomach., Disp: , Rfl:   •  Magnesium 250 MG Tab, Take 250 mg by mouth every day., Disp: , Rfl:   •  calcium carbonate (TUMS) 500 MG Chew Tab, Take 500 mg by mouth every day., Disp: , Rfl:   •   "fluticasone (FLONASE) 50 MCG/ACT nasal spray, Spray 1 Spray in nose every day., Disp: , Rfl:   ALLERGIES: No Known Allergies  SURGHX:   Past Surgical History:   Procedure Laterality Date   • VENTRAL HERNIA REPAIR ROBOTIC N/A 10/2/2017    Procedure: VENTRAL HERNIA REPAIR ROBOTIC- WITH MESH;  Surgeon: Brooks Aldana M.D.;  Location: SURGERY [facility];  Service: General   • CHOLECYSTECTOMY  2012   • OTHER NEUROLOGICAL SURG  09/2010    chiari malformation- fossa decompression   • OTHER Bilateral 2000    lasik eye surgery     SOCHX:  reports that she quit smoking about 7 months ago. Her smoking use included Cigarettes. She has a 9.00 pack-year smoking history. She uses smokeless tobacco. She reports that she does not drink alcohol or use drugs.  FH: family history includes Arthritis in her father; GI in her father; Lung Disease in her father; Other in her sister; Thyroid in her mother, sister, and sister.      Medications, Allergies, and current problem list reviewed today in Epic     Objective:     /76   Pulse 79   Temp 36.8 °C (98.2 °F)   Resp 16   Ht 1.651 m (5' 5\")   Wt 119.7 kg (264 lb)   SpO2 98%   BMI 43.93 kg/m²      Physical Exam   Constitutional: She is oriented to person, place, and time. She appears well-developed and well-nourished. No distress.   HENT:   Head: Normocephalic and atraumatic.   Eyes: Conjunctivae and EOM are normal.   Neck: Normal range of motion. Neck supple.   Cardiovascular: Normal rate, regular rhythm and normal heart sounds.    Pulmonary/Chest: Effort normal and breath sounds normal. No respiratory distress. She has no wheezes.   Neurological: She is alert and oriented to person, place, and time.   Skin: Skin is warm and dry. She is not diaphoretic.   Psychiatric: She has a normal mood and affect. Her behavior is normal. Judgment and thought content normal.   Nursing note and vitals reviewed.         Urinalysis: Positive leukocytes, positive nitrates   "   Assessment/Plan:     1. Acute cystitis without hematuria  nitrofurantoin monohydr macro (MACROBID) 100 MG Cap   2. Dysuria  POCT Urinalysis   3. Morbid obesity with BMI of 40.0-44.9, adult (CMS-[facility])  Patient identified as having weight management issue.  Appropriate orders and counseling given.     Take full course of antibiotic  Significantly increase fluids  OTC Motrin or Azo as needed  Cranberry as tolerated  Return to clinic or go to ED if symptoms worsen or persist. Indications for ED discussed at length. Patient voices understanding. Follow-up with your primary care provider in 3-5 days. Red flags discussed.    Please note that this dictation was created using voice recognition software. I have made every reasonable attempt to correct obvious errors, but I expect that there are errors of grammar and possibly content that I did not discover before finalizing the note.

## 2018-02-16 ENCOUNTER — TELEMEDICINE2 (OUTPATIENT)
Dept: URGENT CARE | Facility: PHYSICIAN GROUP | Age: 48
End: 2018-02-16
Payer: COMMERCIAL

## 2018-02-16 VITALS — HEIGHT: 65 IN | BODY MASS INDEX: 44 KG/M2 | WEIGHT: 264.1 LBS

## 2018-02-16 DIAGNOSIS — J01.41 ACUTE RECURRENT PANSINUSITIS: ICD-10-CM

## 2018-02-16 PROCEDURE — 99213 OFFICE O/P EST LOW 20 MIN: CPT | Performed by: FAMILY MEDICINE

## 2018-02-16 RX ORDER — AMOXICILLIN AND CLAVULANATE POTASSIUM 875; 125 MG/1; MG/1
1 TABLET, FILM COATED ORAL 2 TIMES DAILY
Qty: 20 TAB | Refills: 0 | Status: SHIPPED | OUTPATIENT
Start: 2018-02-16 | End: 2018-02-26

## 2018-02-16 ASSESSMENT — ENCOUNTER SYMPTOMS
EYE REDNESS: 0
WEIGHT LOSS: 0
MYALGIAS: 0
EYE DISCHARGE: 0

## 2018-02-16 NOTE — PROGRESS NOTES
"Subjective:      Demetra Kendrick is a 47 y.o. female who presents with Sinus Problem (poss sinus infection x3 days)            3 days sinus pressure/drainage. No fever/chills. Used nasal lavage with some improvement. +PMH sinusitis/no sinus surgery. Sx's are moderate to severe/worse with head position. Mild ST/cough due to drainage. OTC sudafed with some relief. No other aggravating or alleviating factors.          Review of Systems   Constitutional: Positive for malaise/fatigue (mild). Negative for weight loss.   HENT: Positive for ear pain (intermittent). Negative for ear discharge.    Eyes: Negative for discharge and redness.   Musculoskeletal: Negative for joint pain and myalgias.     .  Medications, Allergies, and current problem list reviewed today in Epic       Objective:     Ht 1.651 m (5' 5\")   Wt 119.8 kg (264 lb 1.6 oz)   BMI 43.95 kg/m²      Physical Exam   Constitutional: She appears well-developed and well-nourished. No distress.   HENT:   Head: Normocephalic and atraumatic.   Patient points to left frontal and maxillary sinus as site of pain/pressure. +PND noted with opening mouth near camera.    Eyes: Conjunctivae are normal.   Cardiovascular:   Pulse 70 per fit bit     Neurological:   Speech is clear. Patient is appropriate and cooperative.     Skin: No rash (not on face) noted.               Assessment/Plan:     1. Acute recurrent pansinusitis  amoxicillin-clavulanate (AUGMENTIN) 875-125 MG Tab       Differential diagnosis, natural history, supportive care, and indications for immediate follow-up discussed at length.   Contingent antibiotic prescription given to patient to fill upon meeting criteria of guidelines discussed.   Nasal saline, decongestant, flonase    Limitations and risks of virtual visit discussed with patient.     "

## 2019-01-26 ENCOUNTER — OFFICE VISIT (OUTPATIENT)
Dept: URGENT CARE | Facility: PHYSICIAN GROUP | Age: 49
End: 2019-01-26
Payer: COMMERCIAL

## 2019-01-26 VITALS
HEIGHT: 65 IN | BODY MASS INDEX: 45.79 KG/M2 | TEMPERATURE: 97.5 F | OXYGEN SATURATION: 95 % | DIASTOLIC BLOOD PRESSURE: 72 MMHG | WEIGHT: 274.8 LBS | HEART RATE: 75 BPM | SYSTOLIC BLOOD PRESSURE: 108 MMHG | RESPIRATION RATE: 14 BRPM

## 2019-01-26 DIAGNOSIS — J06.9 VIRAL URI WITH COUGH: ICD-10-CM

## 2019-01-26 PROCEDURE — 99213 OFFICE O/P EST LOW 20 MIN: CPT | Performed by: NURSE PRACTITIONER

## 2019-01-26 RX ORDER — ACETAMINOPHEN 325 MG/1
650 TABLET ORAL EVERY 4 HOURS PRN
COMMUNITY

## 2019-01-26 ASSESSMENT — ENCOUNTER SYMPTOMS
COUGH: 1
EYE DISCHARGE: 0
CHILLS: 0
WHEEZING: 0
SPUTUM PRODUCTION: 0
SORE THROAT: 0
MYALGIAS: 1
ORTHOPNEA: 0
FEVER: 0
NAUSEA: 0
HEADACHES: 1
DIARRHEA: 0
SHORTNESS OF BREATH: 0

## 2019-01-27 NOTE — PROGRESS NOTES
Subjective:      Demetra Kendrick is a 48 y.o. female who presents with Cough (congestion. Onset Wed.)            HPI New problem. 48 year old female with cough and congestion since Wednesday. She states she has had fever as well but this has been unmeasured. She denies sore throat, ear pain, diarrhea. States +body aches. She has not taken any medication for this issue.  Patient has no known allergies.  Current Outpatient Prescriptions on File Prior to Visit   Medication Sig Dispense Refill   • Kelsi, Zingiber officinalis, (KELSI ROOT PO) Take 1 Tab by mouth every day.     • omeprazole (PRILOSEC) 20 MG delayed-release capsule Take 20 mg by mouth every day.     • fluoxetine (PROZAC) 20 MG Cap Take 20 mg by mouth every day.     • fluticasone (FLONASE) 50 MCG/ACT nasal spray Spray 1 Spray in nose every day.     • levothyroxine (SYNTHROID) 125 MCG Tab Take 62.5 mcg by mouth Every morning on an empty stomach.     • Magnesium 250 MG Tab Take 250 mg by mouth every day.     • calcium carbonate (TUMS) 500 MG Chew Tab Take 500 mg by mouth every day.       No current facility-administered medications on file prior to visit.      Social History     Social History   • Marital status:      Spouse name: N/A   • Number of children: N/A   • Years of education: N/A     Occupational History   • housewife Unemplyed     Social History Main Topics   • Smoking status: Former Smoker     Packs/day: 0.50     Years: 18.00     Types: Cigarettes     Quit date: 4/16/2017   • Smokeless tobacco: Never Used      Comment: quit 8/18/12   • Alcohol use No   • Drug use: No      Comment: vape- seldom   • Sexual activity: Yes     Partners: Male     Other Topics Concern   • Not on file     Social History Narrative   • No narrative on file     family history includes Arthritis in her father; GI in her father; Lung Disease in her father; Other in her sister; Thyroid in her mother, sister, and sister.      Review of Systems   Constitutional:  "Positive for malaise/fatigue. Negative for chills and fever.   HENT: Positive for congestion. Negative for sore throat.    Eyes: Negative for discharge.   Respiratory: Positive for cough. Negative for sputum production, shortness of breath and wheezing.    Cardiovascular: Negative for chest pain and orthopnea.   Gastrointestinal: Negative for diarrhea and nausea.   Musculoskeletal: Positive for myalgias.   Neurological: Positive for headaches.   Endo/Heme/Allergies: Negative for environmental allergies.          Objective:     /72   Pulse 75   Temp 36.4 °C (97.5 °F)   Resp 14   Ht 1.651 m (5' 5\")   Wt 124.6 kg (274 lb 12.8 oz)   SpO2 95%   BMI 45.73 kg/m²      Physical Exam   Constitutional: She is oriented to person, place, and time. She appears well-developed and well-nourished. No distress.   HENT:   Head: Normocephalic and atraumatic.   Right Ear: External ear and ear canal normal. Tympanic membrane is not injected and not perforated. No middle ear effusion.   Left Ear: External ear and ear canal normal. Tympanic membrane is not injected and not perforated.  No middle ear effusion.   Nose: Mucosal edema present.   Mouth/Throat: No oropharyngeal exudate or posterior oropharyngeal erythema.   Eyes: Conjunctivae are normal. Right eye exhibits no discharge. Left eye exhibits no discharge.   Neck: Normal range of motion. Neck supple.   Cardiovascular: Normal rate, regular rhythm and normal heart sounds.    No murmur heard.  Pulmonary/Chest: Effort normal and breath sounds normal. No respiratory distress.   Musculoskeletal: Normal range of motion.   Normal movement of all 4 extremities.   Lymphadenopathy:     She has no cervical adenopathy.        Right: No supraclavicular adenopathy present.        Left: No supraclavicular adenopathy present.   Neurological: She is alert and oriented to person, place, and time. Gait normal.   Skin: Skin is warm and dry.   Psychiatric: She has a normal mood and affect. " Her behavior is normal. Thought content normal.   Nursing note and vitals reviewed.              Assessment/Plan:     1. Viral URI with cough       Viral illness at this time with no indication for antibiotics. Reviewed with patient expected course of illness and also reviewed OTC medications that may be used for symptom relief. Follow up 7-10 days if not improving.

## 2021-03-26 ENCOUNTER — ANTICOAGULATION VISIT (OUTPATIENT)
Dept: VASCULAR LAB | Facility: MEDICAL CENTER | Age: 51
End: 2021-03-26
Attending: INTERNAL MEDICINE
Payer: COMMERCIAL

## 2021-03-26 DIAGNOSIS — Z23 IMMUNIZATION DUE: ICD-10-CM

## 2021-03-26 PROCEDURE — 0001A PFIZER SARS-COV-2 VACCINE: CPT

## 2021-03-26 PROCEDURE — 91300 PFIZER SARS-COV-2 VACCINE: CPT

## 2021-03-26 NOTE — PROGRESS NOTES
Pt requested, consented to and received Pfizer COVID vaccine.  Anali Mcgill, Clinical Pharmacist, CDE, CACP

## 2021-04-16 ENCOUNTER — IMMUNIZATION (OUTPATIENT)
Dept: FAMILY PLANNING/WOMEN'S HEALTH CLINIC | Facility: IMMUNIZATION CENTER | Age: 51
End: 2021-04-16
Attending: INTERNAL MEDICINE
Payer: COMMERCIAL

## 2021-04-16 DIAGNOSIS — Z23 ENCOUNTER FOR VACCINATION: Primary | ICD-10-CM

## 2021-04-16 PROCEDURE — 91300 PFIZER SARS-COV-2 VACCINE: CPT

## 2021-04-16 PROCEDURE — 0002A PFIZER SARS-COV-2 VACCINE: CPT

## 2023-06-28 ENCOUNTER — APPOINTMENT (OUTPATIENT)
Dept: RADIOLOGY | Facility: MEDICAL CENTER | Age: 53
End: 2023-06-28
Attending: EMERGENCY MEDICINE
Payer: COMMERCIAL

## 2023-06-28 ENCOUNTER — HOSPITAL ENCOUNTER (EMERGENCY)
Facility: MEDICAL CENTER | Age: 53
End: 2023-06-28
Attending: EMERGENCY MEDICINE
Payer: COMMERCIAL

## 2023-06-28 VITALS
DIASTOLIC BLOOD PRESSURE: 70 MMHG | SYSTOLIC BLOOD PRESSURE: 133 MMHG | WEIGHT: 293 LBS | BODY MASS INDEX: 48.82 KG/M2 | HEIGHT: 65 IN | HEART RATE: 92 BPM | OXYGEN SATURATION: 95 % | TEMPERATURE: 97.6 F | RESPIRATION RATE: 18 BRPM

## 2023-06-28 DIAGNOSIS — R10.32 LLQ ABDOMINAL PAIN: ICD-10-CM

## 2023-06-28 LAB
ALBUMIN SERPL BCP-MCNC: 4 G/DL (ref 3.2–4.9)
ALBUMIN/GLOB SERPL: 1.3 G/DL
ALP SERPL-CCNC: 87 U/L (ref 30–99)
ALT SERPL-CCNC: 26 U/L (ref 2–50)
ANION GAP SERPL CALC-SCNC: 11 MMOL/L (ref 7–16)
APPEARANCE UR: CLEAR
AST SERPL-CCNC: 19 U/L (ref 12–45)
BACTERIA #/AREA URNS HPF: ABNORMAL /HPF
BASOPHILS # BLD AUTO: 0.4 % (ref 0–1.8)
BASOPHILS # BLD: 0.05 K/UL (ref 0–0.12)
BILIRUB SERPL-MCNC: 0.3 MG/DL (ref 0.1–1.5)
BILIRUB UR QL STRIP.AUTO: ABNORMAL
BUN SERPL-MCNC: 10 MG/DL (ref 8–22)
CALCIUM ALBUM COR SERPL-MCNC: 8.8 MG/DL (ref 8.5–10.5)
CALCIUM SERPL-MCNC: 8.8 MG/DL (ref 8.4–10.2)
CHLORIDE SERPL-SCNC: 103 MMOL/L (ref 96–112)
CO2 SERPL-SCNC: 23 MMOL/L (ref 20–33)
COLOR UR: ABNORMAL
CREAT SERPL-MCNC: 0.75 MG/DL (ref 0.5–1.4)
EOSINOPHIL # BLD AUTO: 0.11 K/UL (ref 0–0.51)
EOSINOPHIL NFR BLD: 1 % (ref 0–6.9)
EPI CELLS #/AREA URNS HPF: ABNORMAL /HPF
ERYTHROCYTE [DISTWIDTH] IN BLOOD BY AUTOMATED COUNT: 44.9 FL (ref 35.9–50)
GFR SERPLBLD CREATININE-BSD FMLA CKD-EPI: 96 ML/MIN/1.73 M 2
GLOBULIN SER CALC-MCNC: 3.1 G/DL (ref 1.9–3.5)
GLUCOSE SERPL-MCNC: 104 MG/DL (ref 65–99)
GLUCOSE UR STRIP.AUTO-MCNC: 100 MG/DL
HCG SERPL QL: NEGATIVE
HCT VFR BLD AUTO: 39.9 % (ref 37–47)
HGB BLD-MCNC: 12.7 G/DL (ref 12–16)
IMM GRANULOCYTES # BLD AUTO: 0.05 K/UL (ref 0–0.11)
IMM GRANULOCYTES NFR BLD AUTO: 0.4 % (ref 0–0.9)
KETONES UR STRIP.AUTO-MCNC: ABNORMAL MG/DL
LEUKOCYTE ESTERASE UR QL STRIP.AUTO: NEGATIVE
LIPASE SERPL-CCNC: 24 U/L (ref 7–58)
LYMPHOCYTES # BLD AUTO: 1.89 K/UL (ref 1–4.8)
LYMPHOCYTES NFR BLD: 16.8 % (ref 22–41)
MCH RBC QN AUTO: 26.9 PG (ref 27–33)
MCHC RBC AUTO-ENTMCNC: 31.8 G/DL (ref 32.2–35.5)
MCV RBC AUTO: 84.5 FL (ref 81.4–97.8)
MICRO URNS: ABNORMAL
MONOCYTES # BLD AUTO: 0.4 K/UL (ref 0–0.85)
MONOCYTES NFR BLD AUTO: 3.6 % (ref 0–13.4)
NEUTROPHILS # BLD AUTO: 8.74 K/UL (ref 1.82–7.42)
NEUTROPHILS NFR BLD: 77.8 % (ref 44–72)
NITRITE UR QL STRIP.AUTO: POSITIVE
NRBC # BLD AUTO: 0 K/UL
NRBC BLD-RTO: 0 /100 WBC (ref 0–0.2)
PH UR STRIP.AUTO: 5 [PH] (ref 5–8)
PLATELET # BLD AUTO: 205 K/UL (ref 164–446)
PMV BLD AUTO: 10.8 FL (ref 9–12.9)
POTASSIUM SERPL-SCNC: 4.2 MMOL/L (ref 3.6–5.5)
PROT SERPL-MCNC: 7.1 G/DL (ref 6–8.2)
PROT UR QL STRIP: 30 MG/DL
RBC # BLD AUTO: 4.72 M/UL (ref 4.2–5.4)
RBC # URNS HPF: ABNORMAL /HPF
RBC UR QL AUTO: NEGATIVE
SODIUM SERPL-SCNC: 137 MMOL/L (ref 135–145)
SP GR UR STRIP.AUTO: 1.02
WBC # BLD AUTO: 11.2 K/UL (ref 4.8–10.8)
WBC #/AREA URNS HPF: ABNORMAL /HPF

## 2023-06-28 PROCEDURE — 83690 ASSAY OF LIPASE: CPT

## 2023-06-28 PROCEDURE — 85025 COMPLETE CBC W/AUTO DIFF WBC: CPT

## 2023-06-28 PROCEDURE — 80053 COMPREHEN METABOLIC PANEL: CPT

## 2023-06-28 PROCEDURE — 36415 COLL VENOUS BLD VENIPUNCTURE: CPT

## 2023-06-28 PROCEDURE — 74177 CT ABD & PELVIS W/CONTRAST: CPT

## 2023-06-28 PROCEDURE — 700117 HCHG RX CONTRAST REV CODE 255: Performed by: EMERGENCY MEDICINE

## 2023-06-28 PROCEDURE — 81001 URINALYSIS AUTO W/SCOPE: CPT

## 2023-06-28 PROCEDURE — 99284 EMERGENCY DEPT VISIT MOD MDM: CPT

## 2023-06-28 PROCEDURE — 84703 CHORIONIC GONADOTROPIN ASSAY: CPT

## 2023-06-28 RX ADMIN — IOHEXOL 100 ML: 350 INJECTION, SOLUTION INTRAVENOUS at 20:20

## 2023-06-29 NOTE — ED TRIAGE NOTES
Pt comes in by herself  c/o LLQ pain for the last 3 days  has taken OTC med w/ mild effect but pain keeps coming back  thought might be UTI and took AZO w/ no effect   denies N/V/D  no Hx of such

## 2023-06-29 NOTE — ED PROVIDER NOTES
ED Provider Note    CHIEF COMPLAINT  Chief Complaint   Patient presents with    Abdominal Pain     L L q pain  for the last 3 days    no Hx of such         HPI    Primary care provider: Rafael Wylie D.O.   History obtained from: Patient  History limited by: None     Demetra Kendrick is a 53 y.o. female who presents to the ED complaining of left lower quadrant abdominal pain for 3 days without radiation.  She denies fever/chills/chest pain/shortness of breath or difficulty breathing/nausea/vomiting/diarrhea/constipation/hematochezia/dysuria/hematuria/rash.  She denies possibility of pregnancy.  She declines pain medicine at this time.    REVIEW OF SYSTEMS  Please see HPI for pertinent positives/negatives.  All other systems reviewed and are negative.     PAST MEDICAL HISTORY  Past Medical History:   Diagnosis Date    Anesthesia     ponv    Arnold-Chiari malformation (HCC)     Chronic headaches     GERD (gastroesophageal reflux disease)     Heart burn     Hypothyroidism     Psychiatric problem     anxiety    Thyroid disease         SURGICAL HISTORY  Past Surgical History:   Procedure Laterality Date    VENTRAL HERNIA REPAIR ROBOTIC N/A 10/2/2017    Procedure: VENTRAL HERNIA REPAIR ROBOTIC- WITH MESH;  Surgeon: Brooks Aldana M.D.;  Location: SURGERY [facility];  Service: General    CHOLECYSTECTOMY      OTHER NEUROLOGICAL SURG  2010    chiari malformation- fossa decompression    OTHER Bilateral     lasik eye surgery        SOCIAL HISTORY  Social History     Tobacco Use    Smoking status: Every Day     Packs/day: 0.50     Years: 18.00     Pack years: 9.00     Types: Cigarettes     Last attempt to quit: 2017     Years since quittin.2    Smokeless tobacco: Never    Tobacco comments:     quit 12   Substance and Sexual Activity    Alcohol use: Yes     Comment: occ    Drug use: No    Sexual activity: Yes     Partners: Male  "       FAMILY HISTORY  Family History   Problem Relation Age of Onset    Thyroid Mother     Lung Disease Father     GI Disease Father         ulcerative colitis    Arthritis Father         gout    Thyroid Sister     Other Sister         MS    Thyroid Sister         CURRENT MEDICATIONS  Home Medications       Reviewed by Akua Schuler R.N. (Registered Nurse) on 06/28/23 at 1914  Med List Status: Partial     Medication Last Dose Status   acetaminophen (TYLENOL) 325 MG Tab  Active   calcium carbonate (TUMS) 500 MG Chew Tab  Active   fluoxetine (PROZAC) 20 MG Cap  Active   fluticasone (FLONASE) 50 MCG/ACT nasal spray  Active   Cass, Zingiber officinalis, (CASS ROOT PO)  Active   levothyroxine (SYNTHROID) 125 MCG Tab  Active   Magnesium 250 MG Tab  Active   omeprazole (PRILOSEC) 20 MG delayed-release capsule  Active                     ALLERGIES  No Known Allergies     PHYSICAL EXAM  VITAL SIGNS: /70   Pulse 92   Temp 36.4 °C (97.6 °F) (Temporal)   Resp 18   Ht 1.651 m (5' 5\")   Wt (!) 147 kg (324 lb 11.8 oz)   LMP 05/20/2023 (Exact Date)   SpO2 95%   BMI 54.04 kg/m²  @JORGE[[id number]::@     Pulse ox interpretation: 95% I interpret this pulse ox as normal     Constitutional: Well developed, well nourished, alert in no apparent distress, nontoxic appearance    HENT: No external signs of trauma, normocephalic  Eyes: PERRL, conjunctiva without erythema, no discharge, no icterus    Neck: Soft and supple, trachea midline, no stridor, no tenderness, no LAD, good ROM    Cardiovascular: Regular rate and rhythm, no murmurs/rubs/gallops, strong distal pulses and good perfusion    Thorax & Lungs: No respiratory distress, CTAB    Abdomen: Soft, left lower quadrant tenderness to palpation with mild guarding, nondistended, no rebound, normal BS    Back: No CVAT     Extremities: No cyanosis, no edema, no gross deformity, good ROM, intact distal pulses with brisk cap refill    Skin: Warm, dry, no pallor/cyanosis, no " rash noted      Neuro: A/O times 3, no focal deficits noted, ambulating without difficulty  Psychiatric: Cooperative, normal mood and affect, normal judgement, appropriate for clinical situation        DIAGNOSTIC STUDIES / PROCEDURES        LABS  All labs reviewed by me.     Results for orders placed or performed during the hospital encounter of 06/28/23   CBC WITH DIFFERENTIAL   Result Value Ref Range    WBC 11.2 (H) 4.8 - 10.8 K/uL    RBC 4.72 4.20 - 5.40 M/uL    Hemoglobin 12.7 12.0 - 16.0 g/dL    Hematocrit 39.9 37.0 - 47.0 %    MCV 84.5 81.4 - 97.8 fL    MCH 26.9 (L) 27.0 - 33.0 pg    MCHC 31.8 (L) 32.2 - 35.5 g/dL    RDW 44.9 35.9 - 50.0 fL    Platelet Count 205 164 - 446 K/uL    MPV 10.8 9.0 - 12.9 fL    Neutrophils-Polys 77.80 (H) 44.00 - 72.00 %    Lymphocytes 16.80 (L) 22.00 - 41.00 %    Monocytes 3.60 0.00 - 13.40 %    Eosinophils 1.00 0.00 - 6.90 %    Basophils 0.40 0.00 - 1.80 %    Immature Granulocytes 0.40 0.00 - 0.90 %    Nucleated RBC 0.00 0.00 - 0.20 /100 WBC    Neutrophils (Absolute) 8.74 (H) 1.82 - 7.42 K/uL    Lymphs (Absolute) 1.89 1.00 - 4.80 K/uL    Monos (Absolute) 0.40 0.00 - 0.85 K/uL    Eos (Absolute) 0.11 0.00 - 0.51 K/uL    Baso (Absolute) 0.05 0.00 - 0.12 K/uL    Immature Granulocytes (abs) 0.05 0.00 - 0.11 K/uL    NRBC (Absolute) 0.00 K/uL   COMP METABOLIC PANEL   Result Value Ref Range    Sodium 137 135 - 145 mmol/L    Potassium 4.2 3.6 - 5.5 mmol/L    Chloride 103 96 - 112 mmol/L    Co2 23 20 - 33 mmol/L    Anion Gap 11.0 7.0 - 16.0    Glucose 104 (H) 65 - 99 mg/dL    Bun 10 8 - 22 mg/dL    Creatinine 0.75 0.50 - 1.40 mg/dL    Calcium 8.8 8.4 - 10.2 mg/dL    AST(SGOT) 19 12 - 45 U/L    ALT(SGPT) 26 2 - 50 U/L    Alkaline Phosphatase 87 30 - 99 U/L    Total Bilirubin 0.3 0.1 - 1.5 mg/dL    Albumin 4.0 3.2 - 4.9 g/dL    Total Protein 7.1 6.0 - 8.2 g/dL    Globulin 3.1 1.9 - 3.5 g/dL    A-G Ratio 1.3 g/dL   LIPASE   Result Value Ref Range    Lipase 24 7 - 58 U/L   URINALYSIS (UA)     Specimen: Urine, Clean Catch   Result Value Ref Range    Color Orange (A)     Character Clear     Specific Gravity 1.020 <1.035    Ph 5.0 5.0 - 8.0    Glucose 100 (A) Negative mg/dL    Ketones Trace (A) Negative mg/dL    Protein 30 (A) Negative mg/dL    Bilirubin Moderate (A) Negative    Nitrite Positive (A) Negative    Leukocyte Esterase Negative Negative    Occult Blood Negative Negative    Micro Urine Req Microscopic    BETA-HCG QUALITATIVE SERUM   Result Value Ref Range    Beta-Hcg Qualitative Serum Negative Negative   URINE MICROSCOPIC (W/UA)   Result Value Ref Range    WBC 0-2 /hpf    RBC 0-2 /hpf    Bacteria Rare (A) None /hpf    Epithelial Cells Moderate (A) Few /hpf   CORRECTED CALCIUM   Result Value Ref Range    Correct Calcium 8.8 8.5 - 10.5 mg/dL   ESTIMATED GFR   Result Value Ref Range    GFR (CKD-EPI) 96 >60 mL/min/1.73 m 2        RADIOLOGY  I have independently interpreted the diagnostic imaging associated with this visit and am waiting the final reading from the radiologist.   My preliminary interpretation is as follows: No free air or obstructive patterns    CT-ABDOMEN-PELVIS WITH   Final Result         1.  Nonspecific hazy fat stranding in the left lower abdominal mesenteric fat, nonspecific inflammatory or infectious changes.   2.  Hepatomegaly   3.  Irregular hepatic contour favoring changes of cirrhosis   4.  Changes of hepatic steatosis   5.  Fat-containing left inguinal hernia             COURSE & MEDICAL DECISION MAKING  Nursing notes, VS, PMSFHx reviewed in chart.     Review of past medical records shows the patient was last seen at [facility] on April 1, 2021 due to possible COVID-19 exposure.  Last CT abdomen on June 8, 2017 had the following findings:    1.  There is a small fat-containing periumbilical hernia without gross CT evidence of incarceration.  2.  There is diffuse hepatic fatty infiltration.  3.  There is an incidental probable small hemangioma in the right lobe of the  liver.  4.  Gallbladder is surgically absent without biliary dilatation.      Differential diagnoses considered include but are not limited to: Appendicitis, AAA, bowel perforation/obstruction, ileus, diverticulitis, KS/renal colic, UTI/pyelo, colitis, muscle strain, hernia, pregnancy/ectopic, ovarian cyst/torsion      ED Observation Status? Yes; I am placing the patient in to an observation status due to a diagnostic uncertainty as well as therapeutic intensity. Patient placed in observation status at 7:32 PM, 6/28/2023.     Observation plan is as follows: We will obtain laboratory and imaging studies and monitor patient in the ED.    Upon Reevaluation, the patient's condition has: Remained stable and will be discharged.    Patient discharged from ED Observation status at 2140 on June 28, 2023.       INITIAL ASSESSMENT AND PLAN  Care Narrative: This is a 53-year-old female patient with past medical history including Chiari malformation, hypothyroidism, thyroid dysfunction, anxiety who presents to the ED complaining of left lower quadrant abdominal pain for 3 days.  We will obtain laboratory and imaging studies and closely monitor patient in the ED.  She declines pain medicine at this time.      Discussion of management with other QHP or appropriate source(s): None     Escalation of care considered, and ultimately not performed: acute inpatient care management, however at this time, the patient is most appropriate for outpatient management.     Decision tools and prescription drugs considered including, but not limited to: Pain Medications   .        History and physical exam as above.  Laboratory testing shows mild leukocytosis.  UA with moderate epithelial cells limiting its utility but this is unlikely to be UTI since patient without dysuria.  CT abdomen/pelvis with findings as above.  I discussed the findings with patient.  This is an alert and very pleasant patient in no acute distress and nontoxic in  appearance clinically stable during her ED stay.  She declined nausea and pain medicine.  I discussed with her supportive home care for inflammation of her mesenteric fat, outpatient follow-up and return to ED precautions.  Patient verbalized understanding and agreed with plan of care with no further questions or concerns.      The patient is referred to a primary physician for blood pressure management, diabetic screening, and for all other preventative health concerns.       FINAL IMPRESSION  1. LLQ abdominal pain Acute          DISPOSITION  Patient will be discharged home in stable condition.       FOLLOW UP  Rafael Wylie D.O.  [address] [place] [id number] [phone]    Call in 1 day      [facility], Emergency Dept  [address]  [facility] [id number] [phone]    If symptoms worsen         OUTPATIENT MEDICATIONS  Discharge Medication List as of 6/28/2023  9:43 PM             Electronically signed by: Robin Mclain D.O., 6/28/2023 7:31 PM      Portions of this record were made with voice recognition software.  Despite my review, errors may remain.  Please interpret this chart in the appropriate context.

## 2024-03-28 ENCOUNTER — TELEPHONE (OUTPATIENT)
Dept: HEALTH INFORMATION MANAGEMENT | Facility: OTHER | Age: 54
End: 2024-03-28
Payer: COMMERCIAL

## 2024-05-01 NOTE — PROGRESS NOTES
[facility]  GENERAL NEUROLOGY  NEW PATIENT VISIT    Referral source: Dr. Rafael Wylie    CC: Arnold-chiari syndrome without spina bifida or hydrocephalus    HISTORY OF ILLNESS:  Demetra Kendrick is a 54 y.o. woman with a history most notable for headaches and a history of Arnold- Chiari type I which was surgically decompressed in 2010. She is a former patient of Dr. Mike last being seen in 10/21/16. At that time she was trialed on Topamax and Zolmitriptan. She has side effects from the topamax and stopped the medication. Today, Demetra provided the following history:    ***    The following is a summary of headache symptoms, presented in my standard format:    Family History:   Age at onset (years):   Location:   Radiation:   Frequency:   Duration:   Headache Days/Month:   Quality:   Intensity:   Aura:   Photophobia/Phonophobia/Nausea/Vomiting:   Provoked by Physical Activity?:   Triggers:   Associated Symptoms:   Autonomic Signs (such as ptosis, miosis, conjunctival injection, rhinorrhea, increased lacrimation):   Head Trauma:   Association with Menses:   ED Visits:   Hospitalizations:   Missed Work Days ():   Sleep (hours/night):   Caffeine Intake:   Hydration:   Nutrition:   Exercise:   Analgesic Overuse:     Current Medication Regimen:      Medications Tried: Response  Preventive:  -Topamax    Rescue:  - Zolmitriptan    Medications Not Tried:  -     MEDICAL AND SURGICAL HISTORY:  Past Medical History:   Diagnosis Date    Anesthesia     ponv    Arnold-Chiari malformation (HCC)     Chronic headaches     GERD (gastroesophageal reflux disease)     Heart burn     Hypothyroidism     Psychiatric problem     anxiety    Thyroid disease      Past Surgical History:   Procedure Laterality Date    VENTRAL HERNIA REPAIR ROBOTIC N/A 10/2/2017    Procedure: VENTRAL HERNIA REPAIR ROBOTIC- WITH MESH;  Surgeon: Brooks Aldana M.D.;  Location: SURGERY [facility];  Service: General    CHOLECYSTECTOMY       OTHER NEUROLOGICAL SURG  2010    chiari malformation- fossa decompression    OTHER Bilateral     lasik eye surgery     MEDICATIONS:  Current Outpatient Medications   Medication Sig    acetaminophen (TYLENOL) 325 MG Tab Take 650 mg by mouth every four hours as needed.    Ginger, Zingiber officinalis, (GINGER ROOT PO) Take 1 Tab by mouth every day.    omeprazole (PRILOSEC) 20 MG delayed-release capsule Take 20 mg by mouth every day.    calcium carbonate (TUMS) 500 MG Chew Tab Take 500 mg by mouth every day.    fluoxetine (PROZAC) 20 MG Cap Take 20 mg by mouth every day.    fluticasone (FLONASE) 50 MCG/ACT nasal spray Spray 1 Spray in nose every day.    levothyroxine (SYNTHROID) 125 MCG Tab Take 62.5 mcg by mouth Every morning on an empty stomach.    Magnesium 250 MG Tab Take 250 mg by mouth every day.     SOCIAL HISTORY:  Social History     Tobacco Use    Smoking status: Every Day     Current packs/day: 0.00     Average packs/day: 0.5 packs/day for 18.0 years (9.0 ttl pk-yrs)     Types: Cigarettes     Start date: 1999     Last attempt to quit: 2017     Years since quittin.0    Smokeless tobacco: Never    Tobacco comments:     quit 12   Substance Use Topics    Alcohol use: Yes     Comment: occ     Social History     Social History Narrative    Not on file     FAMILY HISTORY:  Family History   Problem Relation Age of Onset    Thyroid Mother     Lung Disease Father     GI Disease Father         ulcerative colitis    Arthritis Father         gout    Thyroid Sister     Other Sister         MS    Thyroid Sister        Vitals ***    REVIEW OF SYSTEMS:  A ROS was completed.  Pertinent positives and negatives were included in the HPI, above.  All other systems were reviewed and are negative.    PHYSICAL EXAM:  General/Medical:  - NAD  - hair, skin, nails, and joints were normal    Neuro:  MENTAL STATUS: awake and alert; no deficits of speech or language; oriented to person, place, and time;  "affect was appropriate to situation    CRANIAL NERVES:    II: acuity: J***/J***, fields: intact to confrontation, pupils: 3/3 to 2/2 without a relative afferent pupillary defect, discs: sharp, no red desaturation noted    III/IV/VI: versions: intact without nystagmus    V: facial sensation: symmetric to light touch    VII: facial expression: symmetric    VIII: hearing: intact to finger rub    IX/X: palate: elevates symmetrically    XI: shoulder shrug: symmetric    XII: tongue: midline    MOTOR:  - bulk: normal throughout  - tone: normal throughout  Upper Extremity Strength  (R/L)    5/5   Elbow flexion 5/5   Elbow extension 5/5   Shoulder abduction 5/5     Lower Extremity Strength  (R/L)   Hip flexion 5/5   Knee extension 5/5   Knee flexion 5/5   Ankle plantarflexion 5/5   Ankle dorsiflexion 5/5     - pronator drift: absent  - abnormal movements: none    SENSATION:  - light touch: ***  - vibration (R/L, seconds): ***/*** at the great toes  - temperature:***  - pinprick: ***  - proprioception: ***  - Romberg: absent    COORDINATION:  - finger to nose: normal, no ataxia on exam  - finger tapping: rapid and accurate, bilaterally  - foot tapping:***    REFLEXES:  Reflex Right Left   BR 2+ 2+   Biceps 2+ 2+   Triceps 2+ 2+   Patellae 2+ 2+   Achilles 2+ 2+   Toes down down     GAIT:  - narrow base and normal  - heel-walk:   - toe-walk:   - tandem gait: intact    REVIEW OF IMAGING STUDIES: I reviewed the following studies:  MRI Brain:  Date: 10/23/16  W/o and w/ contrast?: without  Indication: \"dizziness and lightheadedness for 2 weeks\"  Comparison: MRI brain 4/23/2010  Impression:  1.  Status post suboccipital craniectomy for decompression of Chiari I malformation. Current study now shows generous CSF space at the craniocervical junction and dorsal foramen magnum.  2.  Remainder of the study is unremarkable.    REVIEW OF LABORATORY STUDIES:  No current pertinent labs    ASSESSMENT& PLAN:      Signed: Ruthie WATSON" JOSHUA Almendarez.

## 2024-05-07 ENCOUNTER — APPOINTMENT (OUTPATIENT)
Dept: NEUROLOGY | Facility: MEDICAL CENTER | Age: 54
End: 2024-05-07
Payer: COMMERCIAL

## 2024-06-04 ENCOUNTER — APPOINTMENT (OUTPATIENT)
Dept: NEUROLOGY | Facility: MEDICAL CENTER | Age: 54
End: 2024-06-04
Payer: COMMERCIAL

## 2024-07-19 ENCOUNTER — APPOINTMENT (OUTPATIENT)
Dept: NEUROLOGY | Facility: MEDICAL CENTER | Age: 54
End: 2024-07-19
Payer: COMMERCIAL
